# Patient Record
Sex: FEMALE | Race: WHITE | NOT HISPANIC OR LATINO | Employment: FULL TIME | URBAN - METROPOLITAN AREA
[De-identification: names, ages, dates, MRNs, and addresses within clinical notes are randomized per-mention and may not be internally consistent; named-entity substitution may affect disease eponyms.]

---

## 2018-02-05 ENCOUNTER — OFFICE VISIT (OUTPATIENT)
Dept: URGENT CARE | Facility: CLINIC | Age: 23
End: 2018-02-05
Payer: COMMERCIAL

## 2018-02-05 VITALS
WEIGHT: 102 LBS | DIASTOLIC BLOOD PRESSURE: 74 MMHG | HEIGHT: 62 IN | HEART RATE: 105 BPM | RESPIRATION RATE: 16 BRPM | BODY MASS INDEX: 18.77 KG/M2 | OXYGEN SATURATION: 100 % | TEMPERATURE: 98 F | SYSTOLIC BLOOD PRESSURE: 122 MMHG

## 2018-02-05 DIAGNOSIS — R52 BODY ACHES: ICD-10-CM

## 2018-02-05 DIAGNOSIS — R05.9 COUGH: ICD-10-CM

## 2018-02-05 DIAGNOSIS — B34.9 VIRAL SYNDROME: Primary | ICD-10-CM

## 2018-02-05 LAB
CTP QC/QA: YES
FLUAV AG NPH QL: NEGATIVE
FLUBV AG NPH QL: NEGATIVE

## 2018-02-05 PROCEDURE — 87804 INFLUENZA ASSAY W/OPTIC: CPT | Mod: 59,QW,S$GLB, | Performed by: FAMILY MEDICINE

## 2018-02-05 PROCEDURE — 3008F BODY MASS INDEX DOCD: CPT | Mod: S$GLB,,, | Performed by: FAMILY MEDICINE

## 2018-02-05 PROCEDURE — 99203 OFFICE O/P NEW LOW 30 MIN: CPT | Mod: S$GLB,,, | Performed by: FAMILY MEDICINE

## 2018-02-05 RX ORDER — OSELTAMIVIR PHOSPHATE 75 MG/1
75 CAPSULE ORAL 2 TIMES DAILY
Qty: 10 CAPSULE | Refills: 0 | Status: SHIPPED | OUTPATIENT
Start: 2018-02-05 | End: 2018-02-10

## 2018-02-05 RX ORDER — BENZONATATE 200 MG/1
200 CAPSULE ORAL 3 TIMES DAILY PRN
Qty: 30 CAPSULE | Refills: 0 | Status: SHIPPED | OUTPATIENT
Start: 2018-02-05 | End: 2018-02-15

## 2018-02-05 NOTE — PROGRESS NOTES
"Subjective:       Patient ID: Sharyn Hale is a 22 y.o. female.    Vitals:  height is 5' 2" (1.575 m) and weight is 46.3 kg (102 lb). Her oral temperature is 97.6 °F (36.4 °C). Her blood pressure is 122/74 and her pulse is 105. Her respiration is 16 and oxygen saturation is 100%.     Chief Complaint: Cough    Cough   This is a new problem. The current episode started yesterday. The problem has been unchanged. The problem occurs every few minutes. The cough is non-productive. Associated symptoms include headaches, myalgias, nasal congestion, postnasal drip and a sore throat. Pertinent negatives include no chest pain, chills, ear pain, eye redness, fever, shortness of breath or wheezing. Nothing aggravates the symptoms. She has tried nothing for the symptoms.     Review of Systems   Constitution: Negative for chills, fever and malaise/fatigue.   HENT: Positive for postnasal drip and sore throat. Negative for congestion, ear pain and hoarse voice.    Eyes: Negative for discharge and redness.   Cardiovascular: Negative for chest pain, dyspnea on exertion and leg swelling.   Respiratory: Positive for cough. Negative for shortness of breath, sputum production and wheezing.    Musculoskeletal: Positive for myalgias.   Gastrointestinal: Negative for abdominal pain and nausea.   Neurological: Positive for headaches.       Objective:      Physical Exam   Constitutional: She is oriented to person, place, and time. She appears well-developed and well-nourished.   HENT:   Head: Normocephalic and atraumatic.   Right Ear: External ear normal.   Left Ear: External ear normal.   Mouth/Throat: Oropharynx is clear and moist.   Eyes: EOM are normal. Pupils are equal, round, and reactive to light.   Neck: Normal range of motion. Neck supple. No JVD present. No tracheal deviation present. No thyromegaly present.   Cardiovascular: Normal rate, regular rhythm and normal heart sounds.  Exam reveals no gallop and no friction rub.    No " murmur heard.  Pulmonary/Chest: Breath sounds normal. No respiratory distress. She has no wheezes. She has no rales. She exhibits no tenderness.   Abdominal: Soft. Bowel sounds are normal. She exhibits no distension and no mass. There is no tenderness. There is no rebound and no guarding. No hernia.   Musculoskeletal: Normal range of motion. She exhibits no edema, tenderness or deformity.   Lymphadenopathy:     She has no cervical adenopathy.   Neurological: She is alert and oriented to person, place, and time. She displays normal reflexes. No cranial nerve deficit. She exhibits normal muscle tone. Coordination normal.   Skin: Skin is warm. Capillary refill takes less than 2 seconds. No rash noted. No erythema. No pallor.   Psychiatric: She has a normal mood and affect. Her behavior is normal. Judgment and thought content normal.   Vitals reviewed.      Assessment:       1. Viral syndrome    2. Body aches    3. Cough        Plan:         Viral syndrome  -     oseltamivir (TAMIFLU) 75 MG capsule; Take 1 capsule (75 mg total) by mouth 2 (two) times daily.  Dispense: 10 capsule; Refill: 0    Body aches  -     POCT Influenza A/B    Cough  -     benzonatate (TESSALON) 200 MG capsule; Take 1 capsule (200 mg total) by mouth 3 (three) times daily as needed for Cough.  Dispense: 30 capsule; Refill: 0      Follow up with your doctor in a few days as needed.  Return to the urgent care or go to the ER if symptoms get worse.    Jose Elias Castro MD

## 2018-02-05 NOTE — LETTER
February 5, 2018      [facility]  [place]  [address]  [place] [address]  Phone: [phone]  Fax: [phone]       Patient: Sharyn Hale   YOB: 1995  Date of Visit: 02/05/2018    To Whom It May Concern:    Angela Hale  was at [facility] on 02/05/2018. She may return to work/school on 2/8/18 with no restrictions. If you have any questions or concerns, or if I can be of further assistance, please do not hesitate to contact me.    Sincerely,    Jose Elias Castro MD

## 2018-02-06 NOTE — PATIENT INSTRUCTIONS
"  Viral Syndrome (Adult)  A viral illness may cause a number of symptoms. The symptoms depend on the part of the body that the virus affects. If it settles in your nose, throat, and lungs, it may cause cough, sore throat, congestion, and sometimes headache. If it settles in your stomach and intestinal tract, it may cause vomiting and diarrhea. Sometimes it causes vague symptoms like "aching all over," feeling tired, loss of appetite, or fever.  A viral illness usually lasts 1 to 2 weeks, but sometimes it lasts longer. In some cases, a more serious infection can look like a viral syndrome in the first few days of the illness. You may need another exam and additional tests to know the difference. Watch for the warning signs listed below.  Home care  Follow these guidelines for taking care of yourself at home:  · If symptoms are severe, rest at home for the first 2 to 3 days.  · Stay away from cigarette smoke - both your smoke and the smoke from others.  · You may use over-the-counter acetaminophen or ibuprofen for fever, muscle aching, and headache, unless another medicine was prescribed for this. If you have chronic liver or kidney disease or ever had a stomach ulcer or GI bleeding, talk with your doctor before using these medicines. No one who is younger than 18 and ill with a fever should take aspirin. It may cause severe disease or death.  · Your appetite may be poor, so a light diet is fine. Avoid dehydration by drinking 8 to 12 8-ounce glasses of fluids each day. This may include water; orange juice; lemonade; apple, grape, and cranberry juice; clear fruit drinks; electrolyte replacement and sports drinks; and decaffeinated teas and coffee. If you have been diagnosed with a kidney disease, ask your doctor how much and what types of fluids you should drink to prevent dehydration. If you have kidney disease, drinking too much fluid can cause it build up in the your body and be dangerous to your " health.  · Over-the-counter remedies won't shorten the length of the illness but may be helpful for cough, sore throat; and nasal and sinus congestion. Don't use decongestants if you have high blood pressure.  Follow-up care  Follow up with your healthcare provider if you do not improve over the next week.  Call 911  Get emergency medical care if any of the following occur:  · Convulsion  · Feeling weak, dizzy, or like you are going to faint  · Chest pain, shortness of breath, wheezing, or difficulty breathing  When to seek medical advice  Call your healthcare provider right away if any of these occur:  · Cough with lots of colored sputum (mucus) or blood in your sputum  · Chest pain, shortness of breath, wheezing, or difficulty breathing  · Severe headache; face, neck, or ear pain  · Severe, constant pain in the lower right side of your belly (abdominal)  · Continued vomiting (cant keep liquids down)  · Frequent diarrhea (more than 5 times a day); blood (red or black color) or mucus in diarrhea  · Feeling weak, dizzy, or like you are going to faint  · Extreme thirst  · Fever of 100.4°F (38°C) or higher, or as directed by your healthcare provider  Date Last Reviewed: 9/25/2015  © 9589-2749 [employer]. [address] [place] [address], [place] [address]. All rights reserved. This information is not intended as a substitute for professional medical care. Always follow your healthcare professional's instructions.      Follow up with your doctor in a few days as needed.  Return to the urgent care or go to the ER if symptoms get worse.    Jose Elias Castro MD

## 2019-02-22 ENCOUNTER — OFFICE VISIT (OUTPATIENT)
Dept: URGENT CARE | Facility: CLINIC | Age: 24
End: 2019-02-22
Payer: COMMERCIAL

## 2019-02-22 VITALS
DIASTOLIC BLOOD PRESSURE: 67 MMHG | SYSTOLIC BLOOD PRESSURE: 114 MMHG | TEMPERATURE: 98 F | BODY MASS INDEX: 18.66 KG/M2 | WEIGHT: 102 LBS | HEART RATE: 90 BPM | OXYGEN SATURATION: 100 %

## 2019-02-22 DIAGNOSIS — J11.1 INFLUENZA: Primary | ICD-10-CM

## 2019-02-22 PROCEDURE — 96372 THER/PROPH/DIAG INJ SC/IM: CPT | Mod: S$GLB,,, | Performed by: FAMILY MEDICINE

## 2019-02-22 PROCEDURE — 3008F BODY MASS INDEX DOCD: CPT | Mod: CPTII,S$GLB,, | Performed by: FAMILY MEDICINE

## 2019-02-22 PROCEDURE — 96372 PR INJECTION,THERAP/PROPH/DIAG2ST, IM OR SUBCUT: ICD-10-PCS | Mod: S$GLB,,, | Performed by: FAMILY MEDICINE

## 2019-02-22 PROCEDURE — 99214 PR OFFICE/OUTPT VISIT, EST, LEVL IV, 30-39 MIN: ICD-10-PCS | Mod: 25,S$GLB,, | Performed by: FAMILY MEDICINE

## 2019-02-22 PROCEDURE — 3008F PR BODY MASS INDEX (BMI) DOCUMENTED: ICD-10-PCS | Mod: CPTII,S$GLB,, | Performed by: FAMILY MEDICINE

## 2019-02-22 PROCEDURE — 99214 OFFICE O/P EST MOD 30 MIN: CPT | Mod: 25,S$GLB,, | Performed by: FAMILY MEDICINE

## 2019-02-22 RX ORDER — OSELTAMIVIR PHOSPHATE 75 MG/1
75 CAPSULE ORAL 2 TIMES DAILY
Qty: 10 CAPSULE | Refills: 0 | Status: SHIPPED | OUTPATIENT
Start: 2019-02-22 | End: 2019-02-27

## 2019-02-22 RX ORDER — DEXAMETHASONE SODIUM PHOSPHATE 100 MG/10ML
5 INJECTION INTRAMUSCULAR; INTRAVENOUS
Status: COMPLETED | OUTPATIENT
Start: 2019-02-22 | End: 2019-02-22

## 2019-02-22 RX ORDER — NAPROXEN 375 MG/1
375 TABLET ORAL 2 TIMES DAILY
Qty: 20 TABLET | Refills: 0 | Status: SHIPPED | OUTPATIENT
Start: 2019-02-22 | End: 2019-04-05

## 2019-02-22 RX ORDER — ONDANSETRON 4 MG/1
4 TABLET, FILM COATED ORAL EVERY 6 HOURS PRN
Qty: 20 TABLET | Refills: 0 | Status: SHIPPED | OUTPATIENT
Start: 2019-02-22 | End: 2019-04-05

## 2019-02-22 RX ADMIN — DEXAMETHASONE SODIUM PHOSPHATE 5 MG: 100 INJECTION INTRAMUSCULAR; INTRAVENOUS at 02:02

## 2019-02-22 NOTE — LETTER
February 22, 2019  Sharyn Hale  [address]  [place] [address]                [facility] [place]  [address], [address]  [place] [address]  Phone: [phone]  Fax: [phone] Sharyn Hale was seen and treated in our Urgent Care department   on 2/22/2019. She may return to work in 3 - 5 days.      If you have any questions or concerns, please don't hesitate to call.    Sincerely,        Barber Carter MD

## 2019-02-22 NOTE — PROGRESS NOTES
Subjective:       Patient ID: Sharyn Hale is a 23 y.o. female.    Vitals:  weight is 46.3 kg (102 lb). Her oral temperature is 97.9 °F (36.6 °C). Her blood pressure is 114/67 and her pulse is 90. Her oxygen saturation is 100%.     Chief Complaint: Sore Throat    Sore Throat    This is a new problem. Episode onset: 2 days ago. The problem has been waxing and waning. There has been no fever. The pain is at a severity of 8/10. The pain is moderate. Associated symptoms include coughing, diarrhea, headaches and neck pain. Pertinent negatives include no congestion, ear pain, shortness of breath, stridor, swollen glands, trouble swallowing or vomiting. She has tried nothing for the symptoms.       Constitution: Positive for chills. Negative for sweating, fatigue and fever.   HENT: Positive for sinus pressure and sore throat. Negative for ear pain, congestion, sinus pain, trouble swallowing and voice change.    Neck: Positive for neck pain. Negative for painful lymph nodes.   Eyes: Negative for eye redness.   Respiratory: Positive for cough. Negative for chest tightness, sputum production, bloody sputum, COPD, shortness of breath, stridor, wheezing and asthma.    Gastrointestinal: Positive for nausea and diarrhea. Negative for vomiting.   Musculoskeletal: Negative for muscle ache.   Skin: Negative for rash.   Allergic/Immunologic: Negative for seasonal allergies and asthma.   Neurological: Positive for headaches.   Hematologic/Lymphatic: Negative for swollen lymph nodes.       Objective:      Physical Exam   Constitutional: She is oriented to person, place, and time. She appears well-developed and well-nourished. She is cooperative.  Non-toxic appearance. She does not appear ill. No distress.   HENT:   Head: Normocephalic and atraumatic.   Right Ear: Hearing, tympanic membrane, external ear and ear canal normal.   Left Ear: Hearing, tympanic membrane, external ear and ear canal normal.   Nose: Nose normal. No mucosal  edema, rhinorrhea or nasal deformity. No epistaxis. Right sinus exhibits no maxillary sinus tenderness and no frontal sinus tenderness. Left sinus exhibits no maxillary sinus tenderness and no frontal sinus tenderness.   Mouth/Throat: Uvula is midline and mucous membranes are normal. No trismus in the jaw. Normal dentition. No uvula swelling. Posterior oropharyngeal edema and posterior oropharyngeal erythema present.   Eyes: Conjunctivae and lids are normal. No scleral icterus.   Neck: Trachea normal, full passive range of motion without pain and phonation normal. Neck supple.   Cardiovascular: Normal rate, regular rhythm, normal heart sounds, intact distal pulses and normal pulses.   Pulmonary/Chest: Effort normal and breath sounds normal. No respiratory distress.   Abdominal: Soft. Normal appearance and bowel sounds are normal. She exhibits no distension. There is no tenderness.   Musculoskeletal: Normal range of motion. She exhibits no edema or deformity.   Neurological: She is alert and oriented to person, place, and time. She exhibits normal muscle tone. Coordination normal.   Skin: Skin is warm, dry and intact. She is not diaphoretic. No pallor.   Psychiatric: She has a normal mood and affect. Her speech is normal and behavior is normal. Judgment and thought content normal. Cognition and memory are normal.   Nursing note and vitals reviewed.      Assessment:       1. Influenza        Plan:         Influenza  -     oseltamivir (TAMIFLU) 75 MG capsule; Take 1 capsule (75 mg total) by mouth 2 (two) times daily. for 5 days  Dispense: 10 capsule; Refill: 0  -     dexchlorpheniramin-pseudoephed (RESCON) 2-60 mg Tab; Take 1 tablet by mouth 2 (two) times daily as needed.  Dispense: 20 tablet; Refill: 0  -     naproxen (NAPROSYN) 375 MG tablet; Take 1 tablet (375 mg total) by mouth 2 (two) times daily.  Dispense: 20 tablet; Refill: 0  -     ondansetron (ZOFRAN) 4 MG tablet; Take 1 tablet (4 mg total) by mouth every 6  (six) hours as needed for Nausea.  Dispense: 20 tablet; Refill: 0  -     dexamethasone injection 5 mg    Please drink plenty of fluids.  Please get plenty of rest.  Please return here or go to the Emergency Department for any concerns or worsening of condition.  If you were given wait & see antibiotics, please wait 3-5 days before taking them, and only take them if your symptoms have worsened or not improved.  If you do begin taking the antibiotics, please take them to completion.  If you were prescribed antibiotics, please take them to completion.    If you were prescribed a narcotic medication, do not drive or operate heavy equipment or machinery while taking these medications.    You were given a decongestant (RESCON or POLY VENT Dm).  If your insurance does not cover it or you cannot afford it, it is ok to use the over the counter products listed below.  If you do not have Hypertension or any history of palpitations, it is ok to take over the counter Sudafed or Mucinex D or Allegra-D or Claritin-D or Zyrtec-D.  If you do take one of the above, it is ok to combine that with plain over the counter Mucinex or Allegra or Claritin or Zyrtec.  If for example you are taking Zyrtec -D, you can combine that with Mucinex, but not Mucinex-D.  If you are taking Mucinex-D, you can combine that with plain Allegra or Claritin or Zyrtec.   If you do have Hypertension or palpitations, it is safe to take Coricidin HBP for relief of sinus symptoms.    We recommend you take over the counter Flonase (Fluticasone) or another nasally inhaled steroid unless you are already taking one.      Nasal irrigation with a saline spray or Netti Pot like device per their directions is also recommended.  If not allergic, please take over the counter Tylenol (Acetaminophen)  as directed for control of pain and/or fever.  You may take Sudafed every 6 hours as directed for congestion.    You were given an injection of steroid.  This will relieve  swelling and inflammation and improve respiratory symptoms.  It can raise your blood sugar if you are diabetic.    Please follow up with your primary care doctor or specialist as needed.    Tory Gallagher MD  [phone]    If you  smoke, please stop smoking.

## 2019-02-22 NOTE — PATIENT INSTRUCTIONS
Please drink plenty of fluids.  Please get plenty of rest.  Please return here or go to the Emergency Department for any concerns or worsening of condition.  If you were given wait & see antibiotics, please wait 3-5 days before taking them, and only take them if your symptoms have worsened or not improved.  If you do begin taking the antibiotics, please take them to completion.  If you were prescribed antibiotics, please take them to completion.    If you were prescribed a narcotic medication, do not drive or operate heavy equipment or machinery while taking these medications.    You were given a decongestant (RESCON or POLY VENT Dm).  If your insurance does not cover it or you cannot afford it, it is ok to use the over the counter products listed below.  If you do not have Hypertension or any history of palpitations, it is ok to take over the counter Sudafed or Mucinex D or Allegra-D or Claritin-D or Zyrtec-D.  If you do take one of the above, it is ok to combine that with plain over the counter Mucinex or Allegra or Claritin or Zyrtec.  If for example you are taking Zyrtec -D, you can combine that with Mucinex, but not Mucinex-D.  If you are taking Mucinex-D, you can combine that with plain Allegra or Claritin or Zyrtec.   If you do have Hypertension or palpitations, it is safe to take Coricidin HBP for relief of sinus symptoms.    We recommend you take over the counter Flonase (Fluticasone) or another nasally inhaled steroid unless you are already taking one.      Nasal irrigation with a saline spray or Netti Pot like device per their directions is also recommended.  If not allergic, please take over the counter Tylenol (Acetaminophen)  as directed for control of pain and/or fever.  You may take Sudafed every 6 hours as directed for congestion.    You were given an injection of steroid.  This will relieve swelling and inflammation and improve respiratory symptoms.  It can raise your blood sugar if you are  diabetic.    Please follow up with your primary care doctor or specialist as needed.    Tory Gallaghre MD  [phone]    If you  smoke, please stop smoking.         Influenza (Adult)    Influenza is also called the flu. It is a viral illness that affects the air passages of your lungs. It is different from the common cold. The flu can easily be passed from one to person to another. It may be spread through the air by coughing and sneezing. Or it can be spread by touching the sick person and then touching your own eyes, nose, or mouth.  The flu starts 1 to 3 days after you are exposed to the flu virus. It may last for 1 to 2 weeks. You usually dont need to take antibiotics unless you have a complication. This might be an ear or sinus infection or pneumonia.  Symptoms of the flu may be mild or severe. They can include extreme tiredness (wanting to stay in bed all day), chills, fevers, muscle aches, soreness with eye movement, headache, and a dry, hacking cough.  Home care  Follow these guidelines when caring for yourself at home:  · Avoid being around cigarette smoke, whether yours or other peoples.  · Acetaminophen or ibuprofen will help ease your fever, muscle aches, and headache. Dont give aspirin to anyone younger than 18 who has the flu. Aspirin can harm the liver.  · Nausea and loss of appetite are common with the flu. Eat light meals. Drink 6 to 8 glasses of liquids every day. Good choices are water, sport drinks, soft drinks without caffeine, juices, tea, and soup. Extra fluids will also help loosen secretions in your nose and lungs.  · Over-the-counter cold medicines will not make the flu go away faster. But the medicines may help with coughing, sore throat, and congestion in your nose and sinuses. Dont use a decongestant if you have high blood pressure.  · Stay home until your fever has been gone for at least 24 hours without using medicine to reduce fever.  Follow-up care  Follow up with your  healthcare provider, or as advised, if you are not getting better over the next week.  If you are 65 or older, talk with your provider about getting a pneumococcal vaccine every 5 years. You should also get this vaccine if you have chronic asthma or COPD. All adults should get a flu vaccine every fall. Ask your provider about this.  When to seek medical advice  Call your healthcare provider right away if any of these occur:  · Cough with lots of colored sputum (mucus) or blood in your sputum  · Chest pain, shortness of breath, wheezing, or difficulty breathing  · Severe headache, or face, neck, or ear pain  · New rash with fever  · Fever of 100.4°F (38°C) or higher, or as directed by your healthcare provider  · Confusion, behavior change, or seizure  · Severe weakness or dizziness  · You get a fever or cough after getting better for a few days  Date Last Reviewed: 12/23/2014  © 9040-9405 [employer]. [address] [place] [address], [place] [address]. All rights reserved. This information is not intended as a substitute for professional medical care. Always follow your healthcare professional's instructions.

## 2019-04-05 ENCOUNTER — OFFICE VISIT (OUTPATIENT)
Dept: URGENT CARE | Facility: CLINIC | Age: 24
End: 2019-04-05
Payer: COMMERCIAL

## 2019-04-05 VITALS
HEIGHT: 62 IN | SYSTOLIC BLOOD PRESSURE: 102 MMHG | WEIGHT: 102 LBS | DIASTOLIC BLOOD PRESSURE: 62 MMHG | TEMPERATURE: 101 F | RESPIRATION RATE: 18 BRPM | BODY MASS INDEX: 18.77 KG/M2 | HEART RATE: 122 BPM | OXYGEN SATURATION: 100 %

## 2019-04-05 DIAGNOSIS — R50.9 FEVER, UNSPECIFIED FEVER CAUSE: ICD-10-CM

## 2019-04-05 DIAGNOSIS — J02.0 ACUTE STREPTOCOCCAL PHARYNGITIS: Primary | ICD-10-CM

## 2019-04-05 LAB
CTP QC/QA: YES
S PYO RRNA THROAT QL PROBE: POSITIVE

## 2019-04-05 PROCEDURE — 96372 THER/PROPH/DIAG INJ SC/IM: CPT | Mod: S$GLB,,, | Performed by: PHYSICIAN ASSISTANT

## 2019-04-05 PROCEDURE — 87880 STREP A ASSAY W/OPTIC: CPT | Mod: QW,S$GLB,, | Performed by: PHYSICIAN ASSISTANT

## 2019-04-05 PROCEDURE — 96372 PR INJECTION,THERAP/PROPH/DIAG2ST, IM OR SUBCUT: ICD-10-PCS | Mod: S$GLB,,, | Performed by: PHYSICIAN ASSISTANT

## 2019-04-05 PROCEDURE — 87880 POCT RAPID STREP A: ICD-10-PCS | Mod: QW,S$GLB,, | Performed by: PHYSICIAN ASSISTANT

## 2019-04-05 PROCEDURE — 99214 PR OFFICE/OUTPT VISIT, EST, LEVL IV, 30-39 MIN: ICD-10-PCS | Mod: 25,S$GLB,, | Performed by: PHYSICIAN ASSISTANT

## 2019-04-05 PROCEDURE — 3008F BODY MASS INDEX DOCD: CPT | Mod: CPTII,S$GLB,, | Performed by: PHYSICIAN ASSISTANT

## 2019-04-05 PROCEDURE — 99214 OFFICE O/P EST MOD 30 MIN: CPT | Mod: 25,S$GLB,, | Performed by: PHYSICIAN ASSISTANT

## 2019-04-05 PROCEDURE — 3008F PR BODY MASS INDEX (BMI) DOCUMENTED: ICD-10-PCS | Mod: CPTII,S$GLB,, | Performed by: PHYSICIAN ASSISTANT

## 2019-04-05 RX ORDER — CEFDINIR 300 MG/1
300 CAPSULE ORAL 2 TIMES DAILY
Qty: 14 CAPSULE | Refills: 0 | Status: SHIPPED | OUTPATIENT
Start: 2019-04-05 | End: 2019-04-12

## 2019-04-05 RX ORDER — BETAMETHASONE SODIUM PHOSPHATE AND BETAMETHASONE ACETATE 3; 3 MG/ML; MG/ML
6 INJECTION, SUSPENSION INTRA-ARTICULAR; INTRALESIONAL; INTRAMUSCULAR; SOFT TISSUE
Status: COMPLETED | OUTPATIENT
Start: 2019-04-05 | End: 2019-04-05

## 2019-04-05 RX ORDER — IBUPROFEN 200 MG
600 TABLET ORAL
Status: COMPLETED | OUTPATIENT
Start: 2019-04-05 | End: 2019-04-05

## 2019-04-05 RX ADMIN — BETAMETHASONE SODIUM PHOSPHATE AND BETAMETHASONE ACETATE 6 MG: 3; 3 INJECTION, SUSPENSION INTRA-ARTICULAR; INTRALESIONAL; INTRAMUSCULAR; SOFT TISSUE at 01:04

## 2019-04-05 RX ADMIN — Medication 600 MG: at 01:04

## 2019-04-05 NOTE — PROGRESS NOTES
"Subjective:       Patient ID: Sharyn Hale is a 23 y.o. female.    Vitals:  height is 5' 2" (1.575 m) and weight is 46.3 kg (102 lb). Her temperature is 101.3 °F (38.5 °C) (abnormal). Her blood pressure is 102/62 and her pulse is 122 (abnormal). Her respiration is 18 and oxygen saturation is 100%.     Chief Complaint: Sinus Problem    Sinus Problem   This is a new problem. The current episode started yesterday. The problem is unchanged. The maximum temperature recorded prior to her arrival was 100.4 - 100.9 F. The pain is mild. Associated symptoms include headaches and a sore throat. Pertinent negatives include no chills, congestion, coughing, diaphoresis, ear pain, shortness of breath or sinus pressure. Past treatments include nothing.       Constitution: Positive for fever (at home temp 100/ didn't take anything for it ). Negative for chills, sweating and fatigue.   HENT: Positive for postnasal drip and sore throat. Negative for ear pain, congestion, sinus pain, sinus pressure and voice change.    Neck: Positive for painful lymph nodes.   Eyes: Negative for eye redness.   Respiratory: Negative for chest tightness, cough, sputum production, bloody sputum, COPD, shortness of breath, stridor, wheezing and asthma.    Gastrointestinal: Negative for nausea and vomiting.   Musculoskeletal: Positive for muscle ache.   Skin: Negative for rash.   Allergic/Immunologic: Negative for seasonal allergies and asthma.   Neurological: Positive for headaches.   Hematologic/Lymphatic: Positive for swollen lymph nodes.       Objective:      Physical Exam   Constitutional: She is oriented to person, place, and time. She appears well-developed and well-nourished. She is cooperative.  Non-toxic appearance. She does not have a sickly appearance. She does not appear ill. No distress.   HENT:   Head: Normocephalic and atraumatic.   Right Ear: Hearing, tympanic membrane, external ear and ear canal normal.   Left Ear: Hearing, tympanic " membrane, external ear and ear canal normal.   Nose: Nose normal. No mucosal edema, rhinorrhea or nasal deformity. No epistaxis. Right sinus exhibits no maxillary sinus tenderness and no frontal sinus tenderness. Left sinus exhibits no maxillary sinus tenderness and no frontal sinus tenderness.   Mouth/Throat: Uvula is midline and mucous membranes are normal. No trismus in the jaw. Normal dentition. No uvula swelling. Posterior oropharyngeal edema (mild) and posterior oropharyngeal erythema present. No oropharyngeal exudate. Tonsils are 1+ on the right. Tonsils are 1+ on the left. No tonsillar exudate.   Eyes: Pupils are equal, round, and reactive to light. Conjunctivae, EOM and lids are normal. No scleral icterus.   Sclera clear bilat   Neck: Trachea normal, full passive range of motion without pain and phonation normal. Neck supple. No neck rigidity. No edema and no erythema present.   Cardiovascular: Regular rhythm, S1 normal, S2 normal, normal heart sounds, intact distal pulses and normal pulses. Tachycardia present.   Pulmonary/Chest: Effort normal and breath sounds normal. No respiratory distress. She has no decreased breath sounds. She has no wheezes. She has no rhonchi. She has no rales.   Abdominal: Soft. Normal appearance and bowel sounds are normal. She exhibits no distension. There is no tenderness.   Musculoskeletal: Normal range of motion. She exhibits no edema or deformity.   Lymphadenopathy:        Head (right side): Tonsillar adenopathy present.        Head (left side): Tonsillar adenopathy present.   Neurological: She is alert and oriented to person, place, and time. She exhibits normal muscle tone. Coordination normal.   Skin: Skin is warm, dry and intact. She is not diaphoretic. No pallor.   Psychiatric: She has a normal mood and affect. Her speech is normal and behavior is normal. Judgment and thought content normal. Cognition and memory are normal.   Nursing note and vitals reviewed.         Office Visit on 04/05/2019   Component Date Value Ref Range Status    Rapid Strep A Screen 04/05/2019 Positive* Negative Final     Acceptable 04/05/2019 Yes   Final       Assessment:       1. Acute streptococcal pharyngitis    2. Fever, unspecified fever cause        Plan:         Acute streptococcal pharyngitis  -     cefdinir (OMNICEF) 300 MG capsule; Take 1 capsule (300 mg total) by mouth 2 (two) times daily. for 7 days  Dispense: 14 capsule; Refill: 0  -     betamethasone acetate-betamethasone sodium phosphate injection 6 mg    Fever, unspecified fever cause  -     POCT rapid strep A  -     ibuprofen tablet 600 mg      Patient Instructions   · Follow up with your primary care in 2-5 days if symptoms have not improved, or you may return here.  · If you were referred to a specialist, please follow up with that specialty.  · If you were prescribed antibiotics, please take them to completion.  · If you were prescribed a narcotic or any medication with sedative effects, do not drive or operate heavy equipment or machinery while taking these medications.  · You must understand that you have received treatment at an Urgent Care facility only, and that you may be released before all of your medical problems are known or treated. Urgent Care facilities are not equipped to handle life threatening emergencies. It is recommended that you go to an Emergency Department for further evaluation of worsening or concerning symptoms, or possibly life threatening conditions as discussed.                                        If you  smoke, please stop smoking        Symptomatic treatment:    Alternate tylenol and motrin every 4-6 hours  salt water gargles  Cold-eeze helps to reduce the duration of sore throat symptoms  Cepachol helps to numb the discomfort  Chloroseptic spray  Nasal saline spray reduces inflammation and dryness  Warm face compresses as often as you can  Vicks vapor rub at night  Flonase OTC or  Nasacort OTC  Simple foods like chicken noodle soup help  Pedialyte helps with dehydration if lacking appetite  Rest as much as you can        Pharyngitis: Strep (Confirmed)    You have had a positive test for strep throat. Strep throat is a contagious illness. It is spread by coughing, kissing or by touching others after touching your mouth or nose. Symptoms include throat pain that is worse with swallowing, aching all over, headache, and fever. It is treated with antibiotic medicine. This should help you start to feel better in 1 to 2 days.  Home care  · Rest at home. Drink plenty of fluids to you won't get dehydrated.  · No work or school for the first 2 days of taking the antibiotics. After this time, you will not be contagious. You can then return to school or work if you are feeling better.   · Take antibiotic medicine for the full 10 days, even if you feel better. This is very important to ensure the infection is treated. It is also important to prevent medicine-resistant germs from developing. If you were given an antibiotic shot, you don't need any more antibiotics.  · You may use acetaminophen or ibuprofen to control pain or fever, unless another medicine was prescribed for this. Talk with your doctor before taking these medicines if you have chronic liver or kidney disease. Also talk with your doctor if you have had a stomach ulcer or GI bleeding.  · Throat lozenges or sprays help reduce pain. Gargling with warm saltwater will also reduce throat pain. Dissolve 1/2 teaspoon of salt in 1 glass of warm water. This may be useful just before meals.   · Soft foods are OK. Avoid salty or spicy foods.  Follow-up care  Follow up with your healthcare provider or our staff if you don't get better over the next week.  When to seek medical advice  Call your healthcare provider right away if any of these occur:  · Fever of 100.4ºF (38ºC) or higher, or as directed by your healthcare provider  · New or worsening ear  pain, sinus pain, or headache  · Painful lumps in the back of neck  · Stiff neck  · Lymph nodes getting larger or becoming soft in the middle  · You can't swallow liquids or you can't open your mouth wide because of throat pain  · Signs of dehydration. These include very dark urine or no urine, sunken eyes, and dizziness.  · Trouble breathing or noisy breathing  · Muffled voice  · Rash  Date Last Reviewed: 4/13/2015  © 7158-3425 [employer]. [address] [place] [address], [place] [address]. All rights reserved. This information is not intended as a substitute for professional medical care. Always follow your healthcare professional's instructions.

## 2019-04-05 NOTE — PATIENT INSTRUCTIONS
· Follow up with your primary care in 2-5 days if symptoms have not improved, or you may return here.  · If you were referred to a specialist, please follow up with that specialty.  · If you were prescribed antibiotics, please take them to completion.  · If you were prescribed a narcotic or any medication with sedative effects, do not drive or operate heavy equipment or machinery while taking these medications.  · You must understand that you have received treatment at an Urgent Care facility only, and that you may be released before all of your medical problems are known or treated. Urgent Care facilities are not equipped to handle life threatening emergencies. It is recommended that you go to an Emergency Department for further evaluation of worsening or concerning symptoms, or possibly life threatening conditions as discussed.                                        If you  smoke, please stop smoking        Symptomatic treatment:    Alternate tylenol and motrin every 4-6 hours  salt water gargles  Cold-eeze helps to reduce the duration of sore throat symptoms  Cepachol helps to numb the discomfort  Chloroseptic spray  Nasal saline spray reduces inflammation and dryness  Warm face compresses as often as you can  Vicks vapor rub at night  Flonase OTC or Nasacort OTC  Simple foods like chicken noodle soup help  Pedialyte helps with dehydration if lacking appetite  Rest as much as you can        Pharyngitis: Strep (Confirmed)    You have had a positive test for strep throat. Strep throat is a contagious illness. It is spread by coughing, kissing or by touching others after touching your mouth or nose. Symptoms include throat pain that is worse with swallowing, aching all over, headache, and fever. It is treated with antibiotic medicine. This should help you start to feel better in 1 to 2 days.  Home care  · Rest at home. Drink plenty of fluids to you won't get dehydrated.  · No work or school for the first 2 days of  taking the antibiotics. After this time, you will not be contagious. You can then return to school or work if you are feeling better.   · Take antibiotic medicine for the full 10 days, even if you feel better. This is very important to ensure the infection is treated. It is also important to prevent medicine-resistant germs from developing. If you were given an antibiotic shot, you don't need any more antibiotics.  · You may use acetaminophen or ibuprofen to control pain or fever, unless another medicine was prescribed for this. Talk with your doctor before taking these medicines if you have chronic liver or kidney disease. Also talk with your doctor if you have had a stomach ulcer or GI bleeding.  · Throat lozenges or sprays help reduce pain. Gargling with warm saltwater will also reduce throat pain. Dissolve 1/2 teaspoon of salt in 1 glass of warm water. This may be useful just before meals.   · Soft foods are OK. Avoid salty or spicy foods.  Follow-up care  Follow up with your healthcare provider or our staff if you don't get better over the next week.  When to seek medical advice  Call your healthcare provider right away if any of these occur:  · Fever of 100.4ºF (38ºC) or higher, or as directed by your healthcare provider  · New or worsening ear pain, sinus pain, or headache  · Painful lumps in the back of neck  · Stiff neck  · Lymph nodes getting larger or becoming soft in the middle  · You can't swallow liquids or you can't open your mouth wide because of throat pain  · Signs of dehydration. These include very dark urine or no urine, sunken eyes, and dizziness.  · Trouble breathing or noisy breathing  · Muffled voice  · Rash  Date Last Reviewed: 4/13/2015  © 8015-7358 [employer]. [address] [place] [address], [place] [address]. All rights reserved. This information is not intended as a substitute for professional medical care. Always follow your healthcare professional's instructions.

## 2019-04-05 NOTE — LETTER
April 5, 2019      [facility]  [address], [address]  [facility] [address]  Phone: [phone]  Fax: [phone]       Patient: Sharyn Hale   YOB: 1995  Date of Visit: 04/05/2019    To Whom It May Concern:    Angela Hale  was at [facility] on 04/05/2019. She may return to work/school on 4/7/2019 with no restrictions. If you have any questions or concerns, or if I can be of further assistance, please do not hesitate to contact me.    Sincerely,    Marquez Galeano PA-C

## 2019-05-01 ENCOUNTER — CLINICAL SUPPORT (OUTPATIENT)
Dept: URGENT CARE | Facility: CLINIC | Age: 24
End: 2019-05-01
Payer: COMMERCIAL

## 2019-05-01 VITALS — HEIGHT: 62 IN | WEIGHT: 105 LBS | BODY MASS INDEX: 19.32 KG/M2

## 2019-05-01 DIAGNOSIS — Z11.1 PPD SCREENING TEST: Primary | ICD-10-CM

## 2019-05-01 PROCEDURE — 86580 POCT TB SKIN TEST: ICD-10-PCS | Mod: S$GLB,,, | Performed by: FAMILY MEDICINE

## 2019-05-01 PROCEDURE — 86580 TB INTRADERMAL TEST: CPT | Mod: S$GLB,,, | Performed by: FAMILY MEDICINE

## 2019-09-18 ENCOUNTER — OFFICE VISIT (OUTPATIENT)
Dept: URGENT CARE | Facility: CLINIC | Age: 24
End: 2019-09-18
Payer: COMMERCIAL

## 2019-09-18 VITALS
SYSTOLIC BLOOD PRESSURE: 129 MMHG | DIASTOLIC BLOOD PRESSURE: 76 MMHG | TEMPERATURE: 99 F | WEIGHT: 105 LBS | HEART RATE: 112 BPM | OXYGEN SATURATION: 100 % | HEIGHT: 62 IN | BODY MASS INDEX: 19.32 KG/M2

## 2019-09-18 DIAGNOSIS — J02.9 ACUTE PHARYNGITIS, UNSPECIFIED ETIOLOGY: Primary | ICD-10-CM

## 2019-09-18 PROCEDURE — 3008F BODY MASS INDEX DOCD: CPT | Mod: CPTII,S$GLB,, | Performed by: PHYSICIAN ASSISTANT

## 2019-09-18 PROCEDURE — 3008F PR BODY MASS INDEX (BMI) DOCUMENTED: ICD-10-PCS | Mod: CPTII,S$GLB,, | Performed by: PHYSICIAN ASSISTANT

## 2019-09-18 PROCEDURE — 99214 OFFICE O/P EST MOD 30 MIN: CPT | Mod: S$GLB,,, | Performed by: PHYSICIAN ASSISTANT

## 2019-09-18 PROCEDURE — 99214 PR OFFICE/OUTPT VISIT, EST, LEVL IV, 30-39 MIN: ICD-10-PCS | Mod: S$GLB,,, | Performed by: PHYSICIAN ASSISTANT

## 2019-09-18 RX ORDER — PREDNISONE 10 MG/1
10 TABLET ORAL DAILY
Qty: 4 TABLET | Refills: 0 | Status: SHIPPED | OUTPATIENT
Start: 2019-09-18 | End: 2019-09-22

## 2019-09-18 RX ORDER — AMOXICILLIN AND CLAVULANATE POTASSIUM 875; 125 MG/1; MG/1
1 TABLET, FILM COATED ORAL EVERY 12 HOURS
Qty: 20 TABLET | Refills: 0 | Status: SHIPPED | OUTPATIENT
Start: 2019-09-18 | End: 2019-09-28

## 2019-09-18 RX ORDER — FLUCONAZOLE 150 MG/1
150 TABLET ORAL DAILY
Qty: 1 TABLET | Refills: 0 | Status: SHIPPED | OUTPATIENT
Start: 2019-09-18 | End: 2019-09-19

## 2019-09-18 NOTE — PATIENT INSTRUCTIONS
1.  Take all medications as directed. If you have been prescribed antibiotics, make sure to complete them.   2.  Make sure to get plenty of rest. (This is the best way to help your immune system fight illness.)   3.  Keep yourself/patient well hydrated. For adults, it is recommended to drink at least 8-10 glasses of water daily.   4.  Perform warm, salt water gargles to help reduce inflammation and throat discomfort. Chloraseptic sprays and throat lozenges will also help with your throat pain.  5. Change your toothbrush 24 hours after starting antibiotics to prevent reinfection.  6.  For patients above 6 months of age who are not allergic to and are not on anticoagulants, you can alternate Tylenol and Motrin every 4-6 hours for fever above 100.4F and/or pain.  For patients less than 6 months of age, allergic to or intolerant to NSAIDS, have gastritis, gastric ulcers, or history of GI bleeds, are pregnant, or are on anticoagulant therapy, you can take Tylenol every 4 hours as needed for fever above 100.4F and/or pain.   7. You should schedule a follow-up appointment with your Primary Care Provider/Pediatrician for recheck in 2-3 days or as directed at this visit.   8.  If your condition fails to improve in a timely manner, you should receive another evaluation by your Primary Care Provider/Pediatrician to discuss your concerns or return to urgent care for a recheck.  If your condition worsens at any time, you should report immediately to your nearest Emergency Department for further evaluation. **You must understand that you have received Urgent Care treatment only and that you may be released before all of your medical problems are known or treated. You, the patient, are responsible to arrange for follow-up care as instructed.           Pharyngitis: Strep (Presumed)    You have pharyngitis (sore throat). The cause is thought to be the streptococcus, or strep, bacterium. Strep throat infection can cause throat pain  that is worse when swallowing, aching all over, headache, and fever. The infection may be spread by coughing, kissing, or touching others after touching your mouth or nose. Antibiotic medications are given to treat the infection.  Home care  · Rest at home. Drink plenty of fluids to avoid dehydration.  · No work or school for the first 2 days of taking the antibiotics. After this time, you will not be contagious. You can then return to work or school if you are feeling better.   · The antibiotic medication must be taken for the full 10 days, even if you feel better. This is very important to ensure the infection is treated. It is also important to prevent drug-resistant organisms from developing. If you were given an antibiotic shot, no more antibiotics are needed.  · You may use acetaminophen or ibuprofen to control pain or fever, unless another medicine was prescribed for this. If you have chronic liver or kidney disease or ever had a stomach ulcer or GI bleeding, talk with your doctor before using these medicines.  · Throat lozenges or a throat-numbing sprays can help reduce throat pain. Gargling with warm salt water can also help. Dissolve 1/2 teaspoon of salt in 1 8 ounce glass of warm water.   · Avoid salty or spicy foods, which can irritate the throat.  Follow-up care  Follow up with your healthcare provider or our staff if you are not improving over the next week.  When to seek medical advice  Call your healthcare provider right away if any of these occur:  · Fever as directed by your doctor.   · New or worsening ear pain, sinus pain, or headache  · Painful lumps in the back of neck  · Stiff neck  · Lymph nodes are getting larger  · Inability to swallow liquids, excessive drooling, or inability to open mouth wide due to throat pain  · Signs of dehydration (very dark urine or no urine, sunken eyes, dizziness)  · Trouble breathing or noisy breathing  · Muffled voice  · New rash  Date Last Reviewed:  4/13/2015  © 1782-9207 [employer], "[employer] [address] [place] [address], [place] [address]. All rights reserved. This information is not intended as a substitute for professional medical care. Always follow your healthcare professional's instructions.

## 2019-09-18 NOTE — PROGRESS NOTES
"Subjective:       Patient ID: Sharyn Hale is a 23 y.o. female.    Vitals:  height is 5' 2" (1.575 m) and weight is 47.6 kg (105 lb). Her oral temperature is 99 °F (37.2 °C). Her blood pressure is 129/76 and her pulse is 112 (abnormal). Her oxygen saturation is 100%.     Chief Complaint: Sore Throat    23-year-old female presents to clinic today with complaints of a sore throat that has been present for the past 2 days.  Patient also complains of a pressure in her ears, a burning in her nose, and a burning in her eyes.  Patient states that she has not run fever that she is aware of.  She states that her boyfriend was diagnosed with strep 1 week ago.  Patient denies any other complaints at this time.    Sore Throat    This is a new problem. The current episode started yesterday. The problem has been gradually worsening. There has been no fever. Associated symptoms include ear pain (both ears) and headaches. Pertinent negatives include no abdominal pain, congestion, coughing, diarrhea, ear discharge, neck pain, trouble swallowing or vomiting. She has had exposure to strep. Treatments tried: clovis ramos. The treatment provided no relief.       Constitution: Positive for sweating. Negative for chills, fatigue and fever.   HENT: Positive for ear pain (both ears) and sore throat. Negative for ear discharge, congestion, sinus pain, sinus pressure, trouble swallowing and voice change.         Burning in nose   Neck: Positive for painful lymph nodes. Negative for neck pain.   Cardiovascular: Negative for chest pain.   Eyes: Positive for eye pain (burning). Negative for eye redness.   Respiratory: Negative for chest tightness, cough and sputum production.    Gastrointestinal: Negative for abdominal pain, nausea, vomiting and diarrhea.   Musculoskeletal: Negative for muscle ache.   Skin: Negative for rash.   Allergic/Immunologic: Negative for seasonal allergies.   Neurological: Positive for headaches.   Hematologic/Lymphatic: " Positive for swollen lymph nodes.       Objective:      Physical Exam   Constitutional: She is oriented to person, place, and time. She appears well-developed and well-nourished. No distress.   HENT:   Head: Normocephalic and atraumatic.   Right Ear: Tympanic membrane, external ear and ear canal normal.   Left Ear: Tympanic membrane, external ear and ear canal normal.   Nose: Mucosal edema present. Right sinus exhibits no maxillary sinus tenderness and no frontal sinus tenderness. Left sinus exhibits no maxillary sinus tenderness and no frontal sinus tenderness.   Mouth/Throat: Uvula is midline and mucous membranes are normal. No oral lesions. No trismus in the jaw. No uvula swelling. Posterior oropharyngeal edema and posterior oropharyngeal erythema present. No tonsillar abscesses. Tonsils are 1+ on the right. Tonsils are 1+ on the left. Tonsillar exudate (Right).   Eyes: Pupils are equal, round, and reactive to light. Conjunctivae, EOM and lids are normal.   Neck: Normal range of motion. Neck supple.   Cardiovascular: Normal rate, regular rhythm and normal heart sounds.   Pulmonary/Chest: Effort normal and breath sounds normal. No respiratory distress.   Abdominal: Soft. Normal appearance and bowel sounds are normal. She exhibits no distension and no mass. There is no tenderness.   Musculoskeletal: Normal range of motion.   Lymphadenopathy:     She has cervical adenopathy.   Neurological: She is alert and oriented to person, place, and time. She has normal strength. No cranial nerve deficit or sensory deficit.   Skin: Skin is warm. Capillary refill takes less than 2 seconds.   Psychiatric: She has a normal mood and affect. Her speech is normal and behavior is normal. Judgment and thought content normal. Cognition and memory are normal.   Nursing note and vitals reviewed.      Assessment:       1. Acute pharyngitis, unspecified etiology        Plan:         Acute pharyngitis, unspecified etiology  -      amoxicillin-clavulanate 875-125mg (AUGMENTIN) 875-125 mg per tablet; Take 1 tablet by mouth every 12 (twelve) hours. for 10 days  Dispense: 20 tablet; Refill: 0  -     predniSONE (DELTASONE) 10 MG tablet; Take 1 tablet (10 mg total) by mouth once daily. for 4 days  Dispense: 4 tablet; Refill: 0    Other orders  -     fluconazole (DIFLUCAN) 150 MG Tab; Take 1 tablet (150 mg total) by mouth once daily. for 1 day  Dispense: 1 tablet; Refill: 0      Patient Instructions   1.  Take all medications as directed. If you have been prescribed antibiotics, make sure to complete them.   2.  Make sure to get plenty of rest. (This is the best way to help your immune system fight illness.)   3.  Keep yourself/patient well hydrated. For adults, it is recommended to drink at least 8-10 glasses of water daily.   4.  Perform warm, salt water gargles to help reduce inflammation and throat discomfort. Chloraseptic sprays and throat lozenges will also help with your throat pain.  5. Change your toothbrush 24 hours after starting antibiotics to prevent reinfection.  6.  For patients above 6 months of age who are not allergic to and are not on anticoagulants, you can alternate Tylenol and Motrin every 4-6 hours for fever above 100.4F and/or pain.  For patients less than 6 months of age, allergic to or intolerant to NSAIDS, have gastritis, gastric ulcers, or history of GI bleeds, are pregnant, or are on anticoagulant therapy, you can take Tylenol every 4 hours as needed for fever above 100.4F and/or pain.   7. You should schedule a follow-up appointment with your Primary Care Provider/Pediatrician for recheck in 2-3 days or as directed at this visit.   8.  If your condition fails to improve in a timely manner, you should receive another evaluation by your Primary Care Provider/Pediatrician to discuss your concerns or return to urgent care for a recheck.  If your condition worsens at any time, you should report immediately to your nearest  Emergency Department for further evaluation. **You must understand that you have received Urgent Care treatment only and that you may be released before all of your medical problems are known or treated. You, the patient, are responsible to arrange for follow-up care as instructed.           Pharyngitis: Strep (Presumed)    You have pharyngitis (sore throat). The cause is thought to be the streptococcus, or strep, bacterium. Strep throat infection can cause throat pain that is worse when swallowing, aching all over, headache, and fever. The infection may be spread by coughing, kissing, or touching others after touching your mouth or nose. Antibiotic medications are given to treat the infection.  Home care  · Rest at home. Drink plenty of fluids to avoid dehydration.  · No work or school for the first 2 days of taking the antibiotics. After this time, you will not be contagious. You can then return to work or school if you are feeling better.   · The antibiotic medication must be taken for the full 10 days, even if you feel better. This is very important to ensure the infection is treated. It is also important to prevent drug-resistant organisms from developing. If you were given an antibiotic shot, no more antibiotics are needed.  · You may use acetaminophen or ibuprofen to control pain or fever, unless another medicine was prescribed for this. If you have chronic liver or kidney disease or ever had a stomach ulcer or GI bleeding, talk with your doctor before using these medicines.  · Throat lozenges or a throat-numbing sprays can help reduce throat pain. Gargling with warm salt water can also help. Dissolve 1/2 teaspoon of salt in 1 8 ounce glass of warm water.   · Avoid salty or spicy foods, which can irritate the throat.  Follow-up care  Follow up with your healthcare provider or our staff if you are not improving over the next week.  When to seek medical advice  Call your healthcare provider right away if any of  these occur:  · Fever as directed by your doctor.   · New or worsening ear pain, sinus pain, or headache  · Painful lumps in the back of neck  · Stiff neck  · Lymph nodes are getting larger  · Inability to swallow liquids, excessive drooling, or inability to open mouth wide due to throat pain  · Signs of dehydration (very dark urine or no urine, sunken eyes, dizziness)  · Trouble breathing or noisy breathing  · Muffled voice  · New rash  Date Last Reviewed: 4/13/2015  © 9794-6352 [employer]. [address] [place], [place] [address]. All rights reserved. This information is not intended as a substitute for professional medical care. Always follow your healthcare professional's instructions.

## 2019-09-18 NOTE — LETTER
September 18, 2019      [facility]  [address], [address]  [place] [address]  Phone: [phone]  Fax: [phone]       Patient: Sharyn Hale   YOB: 1995  Date of Visit: 09/18/2019    To Whom It May Concern:    Angela Hale  was at [facility] on 09/18/2019. She may return to work/school on 09/20/2019 with no restrictions. If you have any questions or concerns, or if I can be of further assistance, please do not hesitate to contact me.    Sincerely,      Paulina Hale PA-C

## 2019-12-12 ENCOUNTER — OFFICE VISIT (OUTPATIENT)
Dept: URGENT CARE | Facility: CLINIC | Age: 24
End: 2019-12-12
Payer: COMMERCIAL

## 2019-12-12 VITALS
HEART RATE: 92 BPM | TEMPERATURE: 98 F | BODY MASS INDEX: 19.2 KG/M2 | DIASTOLIC BLOOD PRESSURE: 64 MMHG | WEIGHT: 105 LBS | SYSTOLIC BLOOD PRESSURE: 113 MMHG | RESPIRATION RATE: 18 BRPM | OXYGEN SATURATION: 100 %

## 2019-12-12 DIAGNOSIS — J06.9 UPPER RESPIRATORY TRACT INFECTION, UNSPECIFIED TYPE: Primary | ICD-10-CM

## 2019-12-12 LAB
CTP QC/QA: YES
FLUAV AG NPH QL: NEGATIVE
FLUBV AG NPH QL: NEGATIVE

## 2019-12-12 PROCEDURE — 99214 OFFICE O/P EST MOD 30 MIN: CPT | Mod: S$GLB,,, | Performed by: NURSE PRACTITIONER

## 2019-12-12 PROCEDURE — 87804 INFLUENZA ASSAY W/OPTIC: CPT | Mod: QW,S$GLB,, | Performed by: NURSE PRACTITIONER

## 2019-12-12 PROCEDURE — 99214 PR OFFICE/OUTPT VISIT, EST, LEVL IV, 30-39 MIN: ICD-10-PCS | Mod: S$GLB,,, | Performed by: NURSE PRACTITIONER

## 2019-12-12 PROCEDURE — 87804 POCT INFLUENZA A/B: ICD-10-PCS | Mod: QW,S$GLB,, | Performed by: NURSE PRACTITIONER

## 2019-12-12 RX ORDER — PREDNISONE 20 MG/1
20 TABLET ORAL DAILY
Qty: 5 TABLET | Refills: 0 | Status: SHIPPED | OUTPATIENT
Start: 2019-12-12 | End: 2019-12-17

## 2019-12-12 RX ORDER — FLUTICASONE PROPIONATE 50 MCG
1 SPRAY, SUSPENSION (ML) NASAL DAILY
Qty: 1 BOTTLE | Refills: 0 | Status: SHIPPED | OUTPATIENT
Start: 2019-12-12 | End: 2022-04-05

## 2019-12-12 NOTE — PROGRESS NOTES
Subjective:       Patient ID: Sharyn Hale is a 24 y.o. female.    Vitals:  weight is 47.6 kg (105 lb). Her oral temperature is 97.9 °F (36.6 °C). Her blood pressure is 113/64 and her pulse is 113 (abnormal). Her respiration is 18 and oxygen saturation is 100%.     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  No fever or purulent discharge.    No significant past medical history    History reviewed. No pertinent surgical history.    Sinus Problem   This is a new problem. The current episode started yesterday. The problem has been gradually worsening since onset. There has been no fever. Associated symptoms include coughing, diaphoresis, headaches, neck pain (left side of neck) and sinus pressure. Pertinent negatives include no chills, congestion, ear pain or sore throat. (Ear congestion  ) Past treatments include nothing.       Constitution: Positive for sweating. Negative for chills, fatigue and fever.   HENT: Positive for sinus pressure. Negative for ear pain, ear discharge, congestion, sinus pain, sore throat and voice change.    Neck: Positive for neck pain (left side of neck) and painful lymph nodes. Negative for neck stiffness and neck swelling.   Cardiovascular: Negative for chest pain.   Eyes: Negative for eye redness.   Respiratory: Positive for cough. Negative for chest tightness and sputum production.    Gastrointestinal: Negative for nausea, vomiting and diarrhea.   Genitourinary: Negative for dysuria, frequency, urgency and urine decreased.   Musculoskeletal: Negative for muscle ache.   Skin: Negative for rash.   Allergic/Immunologic: Negative for seasonal allergies and immunocompromised state.   Neurological: Positive for headaches. Negative for dizziness, light-headedness and altered mental status.   Hematologic/Lymphatic: Positive for swollen lymph nodes.    Psychiatric/Behavioral: Negative for altered mental status and confusion.       Objective:      Physical Exam   Constitutional: She is oriented to person, place, and time. She appears well-developed and well-nourished. She is cooperative.  Non-toxic appearance. She appears ill. No distress.   HENT:   Head: Normocephalic and atraumatic.   Right Ear: Hearing, external ear and ear canal normal. A middle ear effusion is present.   Left Ear: Hearing, external ear and ear canal normal. A middle ear effusion is present.   Nose: Mucosal edema and rhinorrhea present. No nasal deformity. No epistaxis. Right sinus exhibits no maxillary sinus tenderness and no frontal sinus tenderness. Left sinus exhibits no maxillary sinus tenderness and no frontal sinus tenderness.   Mouth/Throat: Uvula is midline, oropharynx is clear and moist and mucous membranes are normal. No trismus in the jaw. Normal dentition. No uvula swelling. No oropharyngeal exudate, posterior oropharyngeal edema or posterior oropharyngeal erythema.   Eyes: Conjunctivae and lids are normal. No scleral icterus.   Neck: Trachea normal, full passive range of motion without pain and phonation normal. Neck supple. No neck rigidity. No edema and no erythema present.   Cardiovascular: Normal rate, regular rhythm, normal heart sounds, intact distal pulses and normal pulses.   Pulmonary/Chest: Effort normal and breath sounds normal. No respiratory distress. She has no decreased breath sounds. She has no wheezes. She has no rhonchi. She has no rales.   Abdominal: Normal appearance.   Musculoskeletal: Normal range of motion. She exhibits no edema or deformity.   Lymphadenopathy:     She has cervical adenopathy.        Left cervical: Superficial cervical adenopathy present.   Neurological: She is alert and oriented to person, place, and time. She exhibits normal muscle tone. Coordination normal.   Skin: Skin is warm, dry, intact, not diaphoretic and not pale.   Psychiatric:  She has a normal mood and affect. Her speech is normal and behavior is normal. Judgment and thought content normal. Cognition and memory are normal.   Nursing note and vitals reviewed.        Assessment:       1. Upper respiratory tract infection, unspecified type        Plan:         Upper respiratory tract infection, unspecified type  -     predniSONE (DELTASONE) 20 MG tablet; Take 1 tablet (20 mg total) by mouth once daily. for 5 days  Dispense: 5 tablet; Refill: 0  -     fluticasone propionate (FLONASE) 50 mcg/actuation nasal spray; 1 spray (50 mcg total) by Each Nostril route once daily.  Dispense: 1 Bottle; Refill: 0      Patient Instructions     Viral Upper Respiratory Illness (Adult)  You have a viral upper respiratory illness (URI), which is another term for the common cold. This illness is contagious during the first few days. It is spread through the air by coughing and sneezing. It may also be spread by direct contact (touching the sick person and then touching your own eyes, nose, or mouth). Frequent handwashing will decrease risk of spread. Most viral illnesses go away within 7 to 10 days with rest and simple home remedies. Sometimes the illness may last for several weeks. Antibiotics will not kill a virus, and they are generally not prescribed for this condition.    Home care  · If symptoms are severe, rest at home for the first 2 to 3 days. When you resume activity, don't let yourself get too tired.  · Avoid being exposed to cigarette smoke (yours or others).  · You may use acetaminophen or ibuprofen to control pain and fever, unless another medicine was prescribed. (Note: If you have chronic liver or kidney disease, have ever had a stomach ulcer or gastrointestinal bleeding, or are taking blood-thinning medicines, talk with your healthcare provider before using these medicines.) Aspirin should never be given to anyone under 18 years of age who is ill with a viral infection or fever. It may cause  severe liver or brain damage.  · Your appetite may be poor, so a light diet is fine. Avoid dehydration by drinking 6 to 8 glasses of fluids per day (water, soft drinks, juices, tea, or soup). Extra fluids will help loosen secretions in the nose and lungs.  · Over-the-counter cold medicines will not shorten the length of time youre sick, but they may be helpful for the following symptoms: cough, sore throat, and nasal and sinus congestion. (Note: Do not use decongestants if you have high blood pressure.)  Follow-up care  Follow up with your healthcare provider, or as advised.  When to seek medical advice  Call your healthcare provider right away if any of these occur:  · Cough with lots of colored sputum (mucus)  · Severe headache; face, neck, or ear pain  · Difficulty swallowing due to throat pain  · Fever of 100.4°F (38°C)  Call 911, or get immediate medical care  Call emergency services right away if any of these occur:  · Chest pain, shortness of breath, wheezing, or difficulty breathing  · Coughing up blood  · Inability to swallow due to throat pain  Date Last Reviewed: 9/13/2015  © 5154-3506 [employer]. [address] [place] [address]. All rights reserved. This information is not intended as a substitute for professional medical care. Always follow your healthcare professional's instructions.        Local Lymph Node Swelling in the Neck, No Antibiotic Treatment    You have a swollen lymph node in your neck that is not infected. The lymph nodes are part of the immune system. They are found under the jaw and along the side of the neck, in the armpits, and in the groin. A nearby infection or inflammation causes the lymph nodes in that area to swell. They may also be mildly tender. This is normal.  Antibiotics are not used for a swollen lymph node that is not infected. You can use hot compresses and pain medicine to treat this condition. The pain will get better over the next 7 to 10 days.  The swelling may take several months to go away.  Rarely, a bacterial infection occurs inside the lymph node, itself. When this happens, the lymph node becomes very painful and the nearby skin gets red and warm. You may also have a fever. If this happens, call your healthcare provider. You may need to take antibiotics. You may also need to have the lymph node drained.  Home care  Follow these guidelines when caring for yourself at home:  · Make a hot compress by running warm water over a wash cloth. Put the compress on the sore area until the compress cools off. Repeat this for 20 minutes. Use the compress 3 times a day for the first 3 days, or until the pain and redness begin to get better. The heat will make more blood flow to the area and speed the healing process.  · You may use acetaminophen or ibuprofen to control pain and fever, unless another medicine was prescribed for this. Dont use ibuprofen in children under 6 months of age. If you have chronic liver or kidney disease, talk with your healthcare provider before using these medicines. Also talk with your provider if youve had a stomach ulcer or GI bleeding. Dont give aspirin to anyone under 18 years of age who is ill with a fever. It may cause severe liver damage.  Follow-up care  Follow up with your healthcare provider, or as advised.  When to seek medical advice  Call your healthcare provider right away if any of these occur:  · Redness over the lymph node  · Swelling or pain in the lymph node gets worse  · Lymph node is getting soft in the middle  · Pus or fluid drains from the lymph node  · You have trouble breathing or swallowing  · Fever of 100.4°F (38°C) or higher, or as directed by your healthcare provider  · You have questions or concerns   Date Last Reviewed: 2/1/2017  © 3090-3247 [employer]. [address] [place] [address], [place] [address]. All rights reserved. This information is not intended as a substitute for professional medical  care. Always follow your healthcare professional's instructions.      Upper Respiratory Infections    The common cold/ viral upper respiratory infection is an acute, self-limiting infection of the upper respiratory tract characterized by variable degrees of sneezing, nasal congestion and discharge (rhinorrhea), sore throat, cough, low grade fever, headache, and malaise.    Symptoms usually peak on day 2 to 3 of illness and then gradually improve over 7 to 14 days.  A cough may linger for 3 to 4 weeks but should steadily improve over time.       The following information is provided to help you in treating upper respiratory infections.    Decongestant Nasal Sprays  Over-the-counter decongestant nasal spray such as Afrin, may be helpful as an initial step in treating upper respiratory infections. This spray can be used for up to approximately 3 days and is used no more than twice per day. Topical nasal decongestant spray for longer than 5 days will result in a physical addiction, in which the nasal lining will become significantly swollen and irritated until the spray is used again.     Nasal Saline  Nasal saline is available over the counter. There are several different commercial preparations such as Ocean spray and Ayr spray. There is no limit on the use of Nasal saline. Saline is used by snorting the mist up into the nose then later gently blowing the nose to get rid of any secretions that it has loosened.    Nasal Steroids  Nasal steroid medications such as Flonase are useful for upper respiratory infections, allergies, and sensitivities to airborne irritants. Unfortunately, they do not begin to work for 1-2 days, and they do not reach their maximum benefit for approximately 2-3 weeks. Initial therapy is typically 2 puffs per nostril twice per day. This should be used for only a few days, then the maintenance dosage is one or two puffs per nostril once per day. This can be done at any time of the day. The most  effective way to use any nasal medication is to look down at your toes when spraying it in. Aim slightly away from the septum (dividing plate between the nostrils), and gently inhale. This ensures that the spray will go into the sinus cavities and not straight up into the nose. A good way to avoid spraying onto the septum is to use the right hand to spray into the left nostril, and vice versa for the right nostril. Occasionally, nasal steroids can increase the risk of nosebleeds, but in general they are very well tolerated and effective medications.    Antihistamines  Antihistamines are available both over-the-counter and as a prescription. There are also various decongestant and antihistamine combinations available such as Claritin, Allegra, and Zyrtec. It is best to take any antihistamine-decongestant combination in the morning to avoid insomnia. Zyrtec should probably be taken at night, in order to reduce the chance of sleepiness during the daytime. If there is a significant infection present and secretions are already thickened, it is recommended to discontinue antihistamines and use a mucous thinner/decongestant combination.    Mucous Thinners and Decongestants  Mucous thinners and decongestants are used to shrink down the tissues and promote sinus drainage. There are multiple prescription and over-the-counter varieties available. A mucous thinner will tend to be drying unless you are also drinking plenty of water when taking these. If you have high blood pressure, it is very important to monitor your pressure while on decongestants. The mucous thinner/decongestant combinations are typically given twice per day. However, some people will be unable to tolerate these at night and should only take them once per day.    Oral Steroids  Oral steroids can be used with more sever infections. Often, they are the only medications that will reduce the symptoms of pressure and allow the nasal sinuses to drain. These are  best taken on a full stomach and earlier in the day is better. They may give you some irritability, stomach upset, or hyperactivity. This can also interfere with sleep. A person who has high blood pressure or diabetes needs to be very careful to monitor their pressure or blood glucose while taking steroids. Steroids can have multiple side effects when taken long-term, but short-term doses are very well tolerated and extremely effective in controlling the symptoms associated with acute and chronic sinus infections, severe allergies, or nasal polyps. The only significant side effect of note with oral steroids is the extraordinarily uncommon occurrence of damage to the hip cartilage, which is very rare and is usually associated with long-term usage of steroids. The use of steroids for greater than approximately seven days requires a tapering down in order to discontinue them. You should not abruptly stop your steroid if you have been taking the same dose for greater than one week.    Antibiotic Treatment  Finally, when all of these other measures have failed, and a bacterial infection is present, an antibiotic will be prescribed. The most common symptoms of acute sinusitis of a bacterial nature are pain, pressure, and thick and colored nasal drainage. However, not all colored drainage means that there is a bacterial infection present. According to the Center for Disease Control, only 2% of colds will progress to result in bacterial sinusitis. Most upper respiratory infections should NOT be treated with antibiotics. Antibiotics should be reserved for upper respiratory infections which last longer than 10 days, or which worsen after 4 or 5 days of treatment. The use of antibiotics for nonbacterial upper respiratory infections has resulted in a severe problem with the emergence of bacteria which are resistant to multiple forms of antibiotics, and some bacteria are currently only treatable with intravenous  antibiotics.    Body Aches/Pains/Fever  For patients who are not allergic to and are not on anticoagulants, you can alternate Tylenol and Motrin every 4-6 hours for fever above 100.4F and/or pain.  For patients who are allergic or intolerant to NSAIDS, have gastritis, gastric ulcers, or history of GI bleeds, are pregnant, or are on anticoagulant therapy, you can take Tylenol every 4 hours as needed for fever above 100.4F and/or pain.     Maintain adequate hydration -  Rest and keep yourself/patient well hydrated. For adults, it is recommended to drink at least 8-10 glasses of water daily.  This may help thin secretions and soothe the respiratory mucosa.     Drink Hot Liquids (coffee, water, tea, hot chocolate or soup). Put liquid in a Mug and place in Microwave for 2 to 3 minutes. CHANGE THE CUP THAT WAS USED IN THE MICROWAVE SO AS NOT TO BURN YOUR MOUTH.  Sniff the steam from the cup and sip the heated liquid TEN TO TWELVE TIMES A DAY for 5 to 7 Days.    COUGH  A viral cough may linger for 3 to 4 weeks but should steadily improve over time.   Coughing is the body's natural way to clear mucus and help get rid of bacteria and viruses. Therefore, cough suppressants are usually not recomended.  Common cough suppressants include syrups with the ingredient dextromethorphan or DM, available over-the-counter, or prescription syrups containing codeine or hydrocone.  There is no proven benefit and have potential harms.       ?Honey may be beneficial, especially on nocturnal cough.   1 to 2 teaspoons can be taken straight or diluted in tea, juice or other liquid.    The antioxidants in honey are an important contributor to its decongestant properties. Generally speaking, darker honey contains more antioxidants. Buckwheat and avocado honey are particularly good choices. If these honeys are not available in your area, choose the darkest honey you can find.        1.  Take all medications as directed. If you have been prescribed  antibiotics, make sure to complete them.   2.  Rest and keep yourself/patient well hydrated. For adults, it is recommended to drink at least 8-10 glasses of water daily.   3.  For patients above 6 months of age who are not allergic to and are not on anticoagulants, you can alternate Tylenol and Motrin every 4-6 hours for fever above 100.4F and/or pain.  For patients less than 6 months of age, allergic to or intolerant to NSAIDS, have gastritis, gastric ulcers, or history of GI bleeds, are pregnant, or are on anticoagulant therapy, you can take Tylenol every 4 hours as needed for fever above 100.4F and/or pain.   4. You should schedule a follow-up appointment with your Primary Care Provider/Pediatrician for recheck in 2-3 days or as directed at this visit.   5.  If your condition fails to improve in a timely manner, you should receive another evaluation by your Primary Care Provider/Pediatrician to discuss your concerns or return to urgent care for a recheck.  If your condition worsens at any time, you should report immediately to your nearest Emergency Department for further evaluation. **You must understand that you have received Urgent Care treatment only and that you may be released before all of your medical problems are known or treated. You, the patient, are responsible to arrange for follow-up care as instructed.

## 2019-12-12 NOTE — PATIENT INSTRUCTIONS
Viral Upper Respiratory Illness (Adult)  You have a viral upper respiratory illness (URI), which is another term for the common cold. This illness is contagious during the first few days. It is spread through the air by coughing and sneezing. It may also be spread by direct contact (touching the sick person and then touching your own eyes, nose, or mouth). Frequent handwashing will decrease risk of spread. Most viral illnesses go away within 7 to 10 days with rest and simple home remedies. Sometimes the illness may last for several weeks. Antibiotics will not kill a virus, and they are generally not prescribed for this condition.    Home care  · If symptoms are severe, rest at home for the first 2 to 3 days. When you resume activity, don't let yourself get too tired.  · Avoid being exposed to cigarette smoke (yours or others).  · You may use acetaminophen or ibuprofen to control pain and fever, unless another medicine was prescribed. (Note: If you have chronic liver or kidney disease, have ever had a stomach ulcer or gastrointestinal bleeding, or are taking blood-thinning medicines, talk with your healthcare provider before using these medicines.) Aspirin should never be given to anyone under 18 years of age who is ill with a viral infection or fever. It may cause severe liver or brain damage.  · Your appetite may be poor, so a light diet is fine. Avoid dehydration by drinking 6 to 8 glasses of fluids per day (water, soft drinks, juices, tea, or soup). Extra fluids will help loosen secretions in the nose and lungs.  · Over-the-counter cold medicines will not shorten the length of time youre sick, but they may be helpful for the following symptoms: cough, sore throat, and nasal and sinus congestion. (Note: Do not use decongestants if you have high blood pressure.)  Follow-up care  Follow up with your healthcare provider, or as advised.  When to seek medical advice  Call your healthcare provider right away if any  of these occur:  · Cough with lots of colored sputum (mucus)  · Severe headache; face, neck, or ear pain  · Difficulty swallowing due to throat pain  · Fever of 100.4°F (38°C)  Call 911, or get immediate medical care  Call emergency services right away if any of these occur:  · Chest pain, shortness of breath, wheezing, or difficulty breathing  · Coughing up blood  · Inability to swallow due to throat pain  Date Last Reviewed: 9/13/2015 © 2000-2017 [employer]. [address] [place] [address]. All rights reserved. This information is not intended as a substitute for professional medical care. Always follow your healthcare professional's instructions.        Local Lymph Node Swelling in the Neck, No Antibiotic Treatment    You have a swollen lymph node in your neck that is not infected. The lymph nodes are part of the immune system. They are found under the jaw and along the side of the neck, in the armpits, and in the groin. A nearby infection or inflammation causes the lymph nodes in that area to swell. They may also be mildly tender. This is normal.  Antibiotics are not used for a swollen lymph node that is not infected. You can use hot compresses and pain medicine to treat this condition. The pain will get better over the next 7 to 10 days. The swelling may take several months to go away.  Rarely, a bacterial infection occurs inside the lymph node, itself. When this happens, the lymph node becomes very painful and the nearby skin gets red and warm. You may also have a fever. If this happens, call your healthcare provider. You may need to take antibiotics. You may also need to have the lymph node drained.  Home care  Follow these guidelines when caring for yourself at home:  · Make a hot compress by running warm water over a wash cloth. Put the compress on the sore area until the compress cools off. Repeat this for 20 minutes. Use the compress 3 times a day for the first 3 days, or until  the pain and redness begin to get better. The heat will make more blood flow to the area and speed the healing process.  · You may use acetaminophen or ibuprofen to control pain and fever, unless another medicine was prescribed for this. Dont use ibuprofen in children under 6 months of age. If you have chronic liver or kidney disease, talk with your healthcare provider before using these medicines. Also talk with your provider if youve had a stomach ulcer or GI bleeding. Dont give aspirin to anyone under 18 years of age who is ill with a fever. It may cause severe liver damage.  Follow-up care  Follow up with your healthcare provider, or as advised.  When to seek medical advice  Call your healthcare provider right away if any of these occur:  · Redness over the lymph node  · Swelling or pain in the lymph node gets worse  · Lymph node is getting soft in the middle  · Pus or fluid drains from the lymph node  · You have trouble breathing or swallowing  · Fever of 100.4°F (38°C) or higher, or as directed by your healthcare provider  · You have questions or concerns   Date Last Reviewed: 2/1/2017  © 4585-2009 [employer]. [address] [place] [address]. All rights reserved. This information is not intended as a substitute for professional medical care. Always follow your healthcare professional's instructions.      Upper Respiratory Infections    The common cold/ viral upper respiratory infection is an acute, self-limiting infection of the upper respiratory tract characterized by variable degrees of sneezing, nasal congestion and discharge (rhinorrhea), sore throat, cough, low grade fever, headache, and malaise.    Symptoms usually peak on day 2 to 3 of illness and then gradually improve over 7 to 14 days.  A cough may linger for 3 to 4 weeks but should steadily improve over time.       The following information is provided to help you in treating upper respiratory infections.    Decongestant  Nasal Sprays  Over-the-counter decongestant nasal spray such as Afrin, may be helpful as an initial step in treating upper respiratory infections. This spray can be used for up to approximately 3 days and is used no more than twice per day. Topical nasal decongestant spray for longer than 5 days will result in a physical addiction, in which the nasal lining will become significantly swollen and irritated until the spray is used again.     Nasal Saline  Nasal saline is available over the counter. There are several different commercial preparations such as Ocean spray and Ayr spray. There is no limit on the use of Nasal saline. Saline is used by snorting the mist up into the nose then later gently blowing the nose to get rid of any secretions that it has loosened.    Nasal Steroids  Nasal steroid medications such as Flonase are useful for upper respiratory infections, allergies, and sensitivities to airborne irritants. Unfortunately, they do not begin to work for 1-2 days, and they do not reach their maximum benefit for approximately 2-3 weeks. Initial therapy is typically 2 puffs per nostril twice per day. This should be used for only a few days, then the maintenance dosage is one or two puffs per nostril once per day. This can be done at any time of the day. The most effective way to use any nasal medication is to look down at your toes when spraying it in. Aim slightly away from the septum (dividing plate between the nostrils), and gently inhale. This ensures that the spray will go into the sinus cavities and not straight up into the nose. A good way to avoid spraying onto the septum is to use the right hand to spray into the left nostril, and vice versa for the right nostril. Occasionally, nasal steroids can increase the risk of nosebleeds, but in general they are very well tolerated and effective medications.    Antihistamines  Antihistamines are available both over-the-counter and as a prescription. There are  also various decongestant and antihistamine combinations available such as Claritin, Allegra, and Zyrtec. It is best to take any antihistamine-decongestant combination in the morning to avoid insomnia. Zyrtec should probably be taken at night, in order to reduce the chance of sleepiness during the daytime. If there is a significant infection present and secretions are already thickened, it is recommended to discontinue antihistamines and use a mucous thinner/decongestant combination.    Mucous Thinners and Decongestants  Mucous thinners and decongestants are used to shrink down the tissues and promote sinus drainage. There are multiple prescription and over-the-counter varieties available. A mucous thinner will tend to be drying unless you are also drinking plenty of water when taking these. If you have high blood pressure, it is very important to monitor your pressure while on decongestants. The mucous thinner/decongestant combinations are typically given twice per day. However, some people will be unable to tolerate these at night and should only take them once per day.    Oral Steroids  Oral steroids can be used with more sever infections. Often, they are the only medications that will reduce the symptoms of pressure and allow the nasal sinuses to drain. These are best taken on a full stomach and earlier in the day is better. They may give you some irritability, stomach upset, or hyperactivity. This can also interfere with sleep. A person who has high blood pressure or diabetes needs to be very careful to monitor their pressure or blood glucose while taking steroids. Steroids can have multiple side effects when taken long-term, but short-term doses are very well tolerated and extremely effective in controlling the symptoms associated with acute and chronic sinus infections, severe allergies, or nasal polyps. The only significant side effect of note with oral steroids is the extraordinarily uncommon occurrence of  damage to the hip cartilage, which is very rare and is usually associated with long-term usage of steroids. The use of steroids for greater than approximately seven days requires a tapering down in order to discontinue them. You should not abruptly stop your steroid if you have been taking the same dose for greater than one week.    Antibiotic Treatment  Finally, when all of these other measures have failed, and a bacterial infection is present, an antibiotic will be prescribed. The most common symptoms of acute sinusitis of a bacterial nature are pain, pressure, and thick and colored nasal drainage. However, not all colored drainage means that there is a bacterial infection present. According to the Center for Disease Control, only 2% of colds will progress to result in bacterial sinusitis. Most upper respiratory infections should NOT be treated with antibiotics. Antibiotics should be reserved for upper respiratory infections which last longer than 10 days, or which worsen after 4 or 5 days of treatment. The use of antibiotics for nonbacterial upper respiratory infections has resulted in a severe problem with the emergence of bacteria which are resistant to multiple forms of antibiotics, and some bacteria are currently only treatable with intravenous antibiotics.    Body Aches/Pains/Fever  For patients who are not allergic to and are not on anticoagulants, you can alternate Tylenol and Motrin every 4-6 hours for fever above 100.4F and/or pain.  For patients who are allergic or intolerant to NSAIDS, have gastritis, gastric ulcers, or history of GI bleeds, are pregnant, or are on anticoagulant therapy, you can take Tylenol every 4 hours as needed for fever above 100.4F and/or pain.     Maintain adequate hydration -  Rest and keep yourself/patient well hydrated. For adults, it is recommended to drink at least 8-10 glasses of water daily.  This may help thin secretions and soothe the respiratory mucosa.     Drink Hot  Liquids (coffee, water, tea, hot chocolate or soup). Put liquid in a Mug and place in Microwave for 2 to 3 minutes. CHANGE THE CUP THAT WAS USED IN THE MICROWAVE SO AS NOT TO BURN YOUR MOUTH.  Sniff the steam from the cup and sip the heated liquid TEN TO TWELVE TIMES A DAY for 5 to 7 Days.    COUGH  A viral cough may linger for 3 to 4 weeks but should steadily improve over time.   Coughing is the body's natural way to clear mucus and help get rid of bacteria and viruses. Therefore, cough suppressants are usually not recomended.  Common cough suppressants include syrups with the ingredient dextromethorphan or DM, available over-the-counter, or prescription syrups containing codeine or hydrocone.  There is no proven benefit and have potential harms.       ?Honey may be beneficial, especially on nocturnal cough.   1 to 2 teaspoons can be taken straight or diluted in tea, juice or other liquid.    The antioxidants in honey are an important contributor to its decongestant properties. Generally speaking, darker honey contains more antioxidants. Buckwheat and avocado honey are particularly good choices. If these honeys are not available in your area, choose the darkest honey you can find.        1.  Take all medications as directed. If you have been prescribed antibiotics, make sure to complete them.   2.  Rest and keep yourself/patient well hydrated. For adults, it is recommended to drink at least 8-10 glasses of water daily.   3.  For patients above 6 months of age who are not allergic to and are not on anticoagulants, you can alternate Tylenol and Motrin every 4-6 hours for fever above 100.4F and/or pain.  For patients less than 6 months of age, allergic to or intolerant to NSAIDS, have gastritis, gastric ulcers, or history of GI bleeds, are pregnant, or are on anticoagulant therapy, you can take Tylenol every 4 hours as needed for fever above 100.4F and/or pain.   4. You should schedule a follow-up appointment with  your Primary Care Provider/Pediatrician for recheck in 2-3 days or as directed at this visit.   5.  If your condition fails to improve in a timely manner, you should receive another evaluation by your Primary Care Provider/Pediatrician to discuss your concerns or return to urgent care for a recheck.  If your condition worsens at any time, you should report immediately to your nearest Emergency Department for further evaluation. **You must understand that you have received Urgent Care treatment only and that you may be released before all of your medical problems are known or treated. You, the patient, are responsible to arrange for follow-up care as instructed.

## 2020-01-19 DIAGNOSIS — J06.9 UPPER RESPIRATORY TRACT INFECTION, UNSPECIFIED TYPE: ICD-10-CM

## 2020-01-20 RX ORDER — FLUTICASONE PROPIONATE 50 MCG
SPRAY, SUSPENSION (ML) NASAL
Qty: 16 G | OUTPATIENT
Start: 2020-01-20

## 2020-02-13 ENCOUNTER — OFFICE VISIT (OUTPATIENT)
Dept: URGENT CARE | Facility: CLINIC | Age: 25
End: 2020-02-13
Payer: COMMERCIAL

## 2020-02-13 VITALS
HEART RATE: 90 BPM | HEIGHT: 62 IN | BODY MASS INDEX: 19.32 KG/M2 | SYSTOLIC BLOOD PRESSURE: 112 MMHG | OXYGEN SATURATION: 97 % | DIASTOLIC BLOOD PRESSURE: 66 MMHG | TEMPERATURE: 98 F | RESPIRATION RATE: 18 BRPM | WEIGHT: 105 LBS

## 2020-02-13 DIAGNOSIS — J02.9 ACUTE PHARYNGITIS, UNSPECIFIED ETIOLOGY: Primary | ICD-10-CM

## 2020-02-13 DIAGNOSIS — R05.9 COUGH: ICD-10-CM

## 2020-02-13 LAB
CTP QC/QA: YES
FLUAV AG NPH QL: NEGATIVE
FLUBV AG NPH QL: NEGATIVE

## 2020-02-13 PROCEDURE — 99214 OFFICE O/P EST MOD 30 MIN: CPT | Mod: 25,S$GLB,, | Performed by: NURSE PRACTITIONER

## 2020-02-13 PROCEDURE — 99214 PR OFFICE/OUTPT VISIT, EST, LEVL IV, 30-39 MIN: ICD-10-PCS | Mod: 25,S$GLB,, | Performed by: NURSE PRACTITIONER

## 2020-02-13 PROCEDURE — 87804 POCT INFLUENZA A/B: ICD-10-PCS | Mod: QW,S$GLB,, | Performed by: NURSE PRACTITIONER

## 2020-02-13 PROCEDURE — 87804 INFLUENZA ASSAY W/OPTIC: CPT | Mod: QW,S$GLB,, | Performed by: NURSE PRACTITIONER

## 2020-02-13 RX ORDER — PREDNISONE 10 MG/1
10 TABLET ORAL DAILY
Qty: 5 TABLET | Refills: 0 | Status: SHIPPED | OUTPATIENT
Start: 2020-02-13 | End: 2020-02-18

## 2020-02-13 RX ORDER — CEFDINIR 300 MG/1
300 CAPSULE ORAL 2 TIMES DAILY
Qty: 14 CAPSULE | Refills: 0 | Status: SHIPPED | OUTPATIENT
Start: 2020-02-13 | End: 2020-02-20

## 2020-02-13 NOTE — LETTER
February 13, 2020      [facility]  [address], [address]  [place] [address]  Phone: [phone]  Fax: [phone]       Patient: Sharyn Hale   YOB: 1995  Date of Visit: 02/13/2020    To Whom It May Concern:    Angela Hale  was at [facility] on 02/13/2020. She may return to work/school on 2/14/20 with no restrictions. If you have any questions or concerns, or if I can be of further assistance, please do not hesitate to contact me.    Sincerely,    Emily Calix NP

## 2020-02-13 NOTE — PATIENT INSTRUCTIONS
Upper Respiratory Infections    The common cold/ viral upper respiratory infection is an acute, self-limiting infection of the upper respiratory tract characterized by variable degrees of sneezing, nasal congestion and discharge (rhinorrhea), sore throat, cough, low grade fever, headache, and malaise.    Symptoms usually peak on day 2 to 3 of illness and then gradually improve over 7 to 14 days.  A cough may linger for 3 to 4 weeks but should steadily improve over time.       The following information is provided to help you in treating upper respiratory infections.    Decongestant Nasal Sprays  Over-the-counter decongestant nasal spray such as Afrin, may be helpful as an initial step in treating upper respiratory infections. This spray can be used for up to approximately 3 days and is used no more than twice per day. Topical nasal decongestant spray for longer than 5 days will result in a physical addiction, in which the nasal lining will become significantly swollen and irritated until the spray is used again.     Nasal Saline  Nasal saline is available over the counter. There are several different commercial preparations such as Ocean spray and Ayr spray. There is no limit on the use of Nasal saline. Saline is used by snorting the mist up into the nose then later gently blowing the nose to get rid of any secretions that it has loosened.    Nasal Steroids  Nasal steroid medications such as Flonase are useful for upper respiratory infections, allergies, and sensitivities to airborne irritants. Unfortunately, they do not begin to work for 1-2 days, and they do not reach their maximum benefit for approximately 2-3 weeks. Initial therapy is typically 2 puffs per nostril twice per day. This should be used for only a few days, then the maintenance dosage is one or two puffs per nostril once per day. This can be done at any time of the day. The most effective way to use any nasal medication is to look down at your toes  when spraying it in. Aim slightly away from the septum (dividing plate between the nostrils), and gently inhale. This ensures that the spray will go into the sinus cavities and not straight up into the nose. A good way to avoid spraying onto the septum is to use the right hand to spray into the left nostril, and vice versa for the right nostril. Occasionally, nasal steroids can increase the risk of nosebleeds, but in general they are very well tolerated and effective medications.    Antihistamines  Antihistamines are available both over-the-counter and as a prescription. There are also various decongestant and antihistamine combinations available such as Claritin, Allegra, and Zyrtec. It is best to take any antihistamine-decongestant combination in the morning to avoid insomnia. Zyrtec should probably be taken at night, in order to reduce the chance of sleepiness during the daytime. If there is a significant infection present and secretions are already thickened, it is recommended to discontinue antihistamines and use a mucous thinner/decongestant combination.    Mucous Thinners and Decongestants  Mucous thinners and decongestants are used to shrink down the tissues and promote sinus drainage. There are multiple prescription and over-the-counter varieties available. A mucous thinner will tend to be drying unless you are also drinking plenty of water when taking these. If you have high blood pressure, it is very important to monitor your pressure while on decongestants. The mucous thinner/decongestant combinations are typically given twice per day. However, some people will be unable to tolerate these at night and should only take them once per day.    Oral Steroids  Oral steroids can be used with more sever infections. Often, they are the only medications that will reduce the symptoms of pressure and allow the nasal sinuses to drain. These are best taken on a full stomach and earlier in the day is better. They may  give you some irritability, stomach upset, or hyperactivity. This can also interfere with sleep. A person who has high blood pressure or diabetes needs to be very careful to monitor their pressure or blood glucose while taking steroids. Steroids can have multiple side effects when taken long-term, but short-term doses are very well tolerated and extremely effective in controlling the symptoms associated with acute and chronic sinus infections, severe allergies, or nasal polyps. The only significant side effect of note with oral steroids is the extraordinarily uncommon occurrence of damage to the hip cartilage, which is very rare and is usually associated with long-term usage of steroids. The use of steroids for greater than approximately seven days requires a tapering down in order to discontinue them. You should not abruptly stop your steroid if you have been taking the same dose for greater than one week.    Antibiotic Treatment  Finally, when all of these other measures have failed, and a bacterial infection is present, an antibiotic will be prescribed. The most common symptoms of acute sinusitis of a bacterial nature are pain, pressure, and thick and colored nasal drainage. However, not all colored drainage means that there is a bacterial infection present. According to the Center for Disease Control, only 2% of colds will progress to result in bacterial sinusitis. Most upper respiratory infections should NOT be treated with antibiotics. Antibiotics should be reserved for upper respiratory infections which last longer than 10 days, or which worsen after 4 or 5 days of treatment. The use of antibiotics for nonbacterial upper respiratory infections has resulted in a severe problem with the emergence of bacteria which are resistant to multiple forms of antibiotics, and some bacteria are currently only treatable with intravenous antibiotics.    Body Aches/Pains/Fever  For patients who are not allergic to and are not  on anticoagulants, you can alternate Tylenol and Motrin every 4-6 hours for fever above 100.4F and/or pain.  For patients who are allergic or intolerant to NSAIDS, have gastritis, gastric ulcers, or history of GI bleeds, are pregnant, or are on anticoagulant therapy, you can take Tylenol every 4 hours as needed for fever above 100.4F and/or pain.     Maintain adequate hydration -  Rest and keep yourself/patient well hydrated. For adults, it is recommended to drink at least 8-10 glasses of water daily.  This may help thin secretions and soothe the respiratory mucosa.     Sore Throat  Perform warm, salt water gargles to help reduce inflammation and throat discomfort. Chloraseptic sprays and throat lozenges will also help with your throat pain.    COUGH  A viral cough may linger for 3 to 4 weeks but should steadily improve over time. Coughing is the body's natural way to clear mucus and help get rid of bacteria and viruses. Therefore, cough suppressants are usually not recommended.  Common cough suppressants include syrups with the ingredient dextromethorphan or DM, available over-the-counter, or prescription syrups containing codeine or hydrocone.  There is no proven benefit and have potential harms.    Dextromethorphan should be avoided in patients taking a monoamine oxidase inhibitor (MAOI).    Dextromethorphan should be used with caution in patients taking serotonergic drugs (e.g., tramadol, SSRIs).     ? Honey may be beneficial, especially on nocturnal cough.  1 to 2 teaspoons can be taken straight or diluted in tea, juice or other liquid.    The antioxidants in honey are an important contributor to its decongestant properties. Generally speaking, darker honey contains more antioxidants. Buckwheat and avocado honey are particularly good choices. If these honeys are not available in your area, choose the darkest honey you can find.     Avoid Rhododendron honey products. These can contain grayanotoxin and can  lead to cardiovascular side effects (e.g., hypotension, arrhythmias, chest pain).     1.  Take all medications as directed. If you have been prescribed antibiotics, make sure to complete them.   2.  Rest and keep yourself/patient well hydrated. For adults, it is recommended to drink at least 8-10 glasses of water daily.   3.  For patients above 6 months of age who are not allergic to and are not on anticoagulants, you can alternate Tylenol and Motrin every 4-6 hours for fever above 100.4F and/or pain.  For patients less than 6 months of age, allergic to or intolerant to NSAIDS, have gastritis, gastric ulcers, or history of GI bleeds, are pregnant, or are on anticoagulant therapy, you can take Tylenol every 4 hours as needed for fever above 100.4F and/or pain.   4. You should schedule a follow-up appointment with your Primary Care Provider for recheck in 2-3 days or as directed at this visit.   5.  If your condition fails to improve in a timely manner, you should receive another evaluation by your Primary Care Provider to discuss your concerns or return to urgent care for a recheck.      **You must understand that you have received Urgent Care treatment only and that you may be released before all of your medical problems are known or treated. You, the patient, are responsible to arrange for follow-up care as instructed. If your condition worsens at any time, you should report immediately to your nearest Emergency Department for further evaluation.      YOU HAVE BEEN GIVEN A PRESCRIPTION FOR ANTIBIOTICS. IT WAS ADVISED TO DELAY THE COURSE OF ANTIBIOTICS FOR 3-5 DAYS IF SYMPTOMS DO NOT RESOLVE. IT IMPORTANT TO FOLLOW THESE INSTRUCTIONS AS ANTIBIOTIC RESISTANCE IS HIGH. YOUR SYMPTOMS WILL LIKELY RESOLVE WITHOUT THIS PRESCRIPTION.

## 2020-02-13 NOTE — PROGRESS NOTES
"Subjective:       Patient ID: Sharyn Hale is a 24 y.o. female.    Vitals:  height is 5' 2" (1.575 m) and weight is 47.6 kg (105 lb). Her temperature is 97.5 °F (36.4 °C). Her blood pressure is 112/66 and her pulse is 90. Her respiration is 18 and oxygen saturation is 97%.     Chief Complaint: Cough    Cough   Episode onset: 02/10/2020. The problem has been gradually worsening. The cough is non-productive. Associated symptoms include ear pain, headaches, myalgias, postnasal drip and a sore throat. Pertinent negatives include no chills, eye redness, fever, hemoptysis, rash, shortness of breath or wheezing. Treatments tried: nightquil. Her past medical history is significant for bronchitis.       Constitution: Negative for chills, sweating, fatigue and fever.   HENT: Positive for ear pain, postnasal drip, sinus pain, sinus pressure and sore throat. Negative for congestion and voice change.    Neck: Positive for painful lymph nodes.   Eyes: Negative for eye redness.   Respiratory: Positive for chest tightness and cough. Negative for sputum production, bloody sputum, COPD, shortness of breath, stridor, wheezing and asthma.    Gastrointestinal: Negative for nausea and vomiting.   Musculoskeletal: Positive for back pain and muscle ache.   Skin: Negative for rash.   Allergic/Immunologic: Negative for seasonal allergies and asthma.   Neurological: Positive for headaches.   Hematologic/Lymphatic: Positive for swollen lymph nodes.       Objective:      Physical Exam   Constitutional: She is oriented to person, place, and time. She appears well-developed and well-nourished. She is cooperative.  Non-toxic appearance. She appears ill. No distress.   HENT:   Head: Normocephalic and atraumatic.   Right Ear: Hearing, external ear and ear canal normal. Tympanic membrane is erythematous. A middle ear effusion is present.   Left Ear: Hearing, external ear and ear canal normal. A middle ear effusion is present.   Nose: Mucosal edema " present. No nasal deformity. No epistaxis. Right sinus exhibits no maxillary sinus tenderness. Left sinus exhibits no maxillary sinus tenderness and no frontal sinus tenderness.   Mouth/Throat: Uvula is midline and mucous membranes are normal. No trismus in the jaw. Normal dentition. No uvula swelling. Posterior oropharyngeal edema and posterior oropharyngeal erythema present. No oropharyngeal exudate.   Eyes: Conjunctivae and lids are normal. No scleral icterus.   Neck: Trachea normal, full passive range of motion without pain and phonation normal. Neck supple. No neck rigidity. No edema and no erythema present.   Cardiovascular: Normal rate, regular rhythm, normal heart sounds, intact distal pulses and normal pulses.   Pulmonary/Chest: Effort normal and breath sounds normal. No respiratory distress. She has no decreased breath sounds. She has no rhonchi.   Abdominal: Normal appearance.   Musculoskeletal: Normal range of motion. She exhibits no edema or deformity.   Neurological: She is alert and oriented to person, place, and time. She exhibits normal muscle tone. Coordination normal.   Skin: Skin is warm, dry, intact, not diaphoretic and not pale.   Psychiatric: She has a normal mood and affect. Her speech is normal and behavior is normal. Judgment and thought content normal. Cognition and memory are normal.   Nursing note and vitals reviewed.        Assessment:       1. Acute pharyngitis, unspecified etiology    2. Cough        Plan:         Acute pharyngitis, unspecified etiology  -     cefdinir (OMNICEF) 300 MG capsule; Take 1 capsule (300 mg total) by mouth 2 (two) times daily. for 7 days  Dispense: 14 capsule; Refill: 0  -     predniSONE (DELTASONE) 10 MG tablet; Take 1 tablet (10 mg total) by mouth once daily. for 5 days  Dispense: 5 tablet; Refill: 0    Cough  -     POCT Influenza A/B  -     predniSONE (DELTASONE) 10 MG tablet; Take 1 tablet (10 mg total) by mouth once daily. for 5 days  Dispense: 5  tablet; Refill: 0      Results for orders placed or performed in visit on 02/13/20   POCT Influenza A/B   Result Value Ref Range    Rapid Influenza A Ag Negative Negative    Rapid Influenza B Ag Negative Negative     Acceptable Yes         Patient Instructions   Upper Respiratory Infections    The common cold/ viral upper respiratory infection is an acute, self-limiting infection of the upper respiratory tract characterized by variable degrees of sneezing, nasal congestion and discharge (rhinorrhea), sore throat, cough, low grade fever, headache, and malaise.    Symptoms usually peak on day 2 to 3 of illness and then gradually improve over 7 to 14 days.  A cough may linger for 3 to 4 weeks but should steadily improve over time.       The following information is provided to help you in treating upper respiratory infections.    Decongestant Nasal Sprays  Over-the-counter decongestant nasal spray such as Afrin, may be helpful as an initial step in treating upper respiratory infections. This spray can be used for up to approximately 3 days and is used no more than twice per day. Topical nasal decongestant spray for longer than 5 days will result in a physical addiction, in which the nasal lining will become significantly swollen and irritated until the spray is used again.     Nasal Saline  Nasal saline is available over the counter. There are several different commercial preparations such as Ocean spray and Ayr spray. There is no limit on the use of Nasal saline. Saline is used by snorting the mist up into the nose then later gently blowing the nose to get rid of any secretions that it has loosened.    Nasal Steroids  Nasal steroid medications such as Flonase are useful for upper respiratory infections, allergies, and sensitivities to airborne irritants. Unfortunately, they do not begin to work for 1-2 days, and they do not reach their maximum benefit for approximately 2-3 weeks. Initial therapy is  typically 2 puffs per nostril twice per day. This should be used for only a few days, then the maintenance dosage is one or two puffs per nostril once per day. This can be done at any time of the day. The most effective way to use any nasal medication is to look down at your toes when spraying it in. Aim slightly away from the septum (dividing plate between the nostrils), and gently inhale. This ensures that the spray will go into the sinus cavities and not straight up into the nose. A good way to avoid spraying onto the septum is to use the right hand to spray into the left nostril, and vice versa for the right nostril. Occasionally, nasal steroids can increase the risk of nosebleeds, but in general they are very well tolerated and effective medications.    Antihistamines  Antihistamines are available both over-the-counter and as a prescription. There are also various decongestant and antihistamine combinations available such as Claritin, Allegra, and Zyrtec. It is best to take any antihistamine-decongestant combination in the morning to avoid insomnia. Zyrtec should probably be taken at night, in order to reduce the chance of sleepiness during the daytime. If there is a significant infection present and secretions are already thickened, it is recommended to discontinue antihistamines and use a mucous thinner/decongestant combination.    Mucous Thinners and Decongestants  Mucous thinners and decongestants are used to shrink down the tissues and promote sinus drainage. There are multiple prescription and over-the-counter varieties available. A mucous thinner will tend to be drying unless you are also drinking plenty of water when taking these. If you have high blood pressure, it is very important to monitor your pressure while on decongestants. The mucous thinner/decongestant combinations are typically given twice per day. However, some people will be unable to tolerate these at night and should only take them once  per day.    Oral Steroids  Oral steroids can be used with more sever infections. Often, they are the only medications that will reduce the symptoms of pressure and allow the nasal sinuses to drain. These are best taken on a full stomach and earlier in the day is better. They may give you some irritability, stomach upset, or hyperactivity. This can also interfere with sleep. A person who has high blood pressure or diabetes needs to be very careful to monitor their pressure or blood glucose while taking steroids. Steroids can have multiple side effects when taken long-term, but short-term doses are very well tolerated and extremely effective in controlling the symptoms associated with acute and chronic sinus infections, severe allergies, or nasal polyps. The only significant side effect of note with oral steroids is the extraordinarily uncommon occurrence of damage to the hip cartilage, which is very rare and is usually associated with long-term usage of steroids. The use of steroids for greater than approximately seven days requires a tapering down in order to discontinue them. You should not abruptly stop your steroid if you have been taking the same dose for greater than one week.    Antibiotic Treatment  Finally, when all of these other measures have failed, and a bacterial infection is present, an antibiotic will be prescribed. The most common symptoms of acute sinusitis of a bacterial nature are pain, pressure, and thick and colored nasal drainage. However, not all colored drainage means that there is a bacterial infection present. According to the Center for Disease Control, only 2% of colds will progress to result in bacterial sinusitis. Most upper respiratory infections should NOT be treated with antibiotics. Antibiotics should be reserved for upper respiratory infections which last longer than 10 days, or which worsen after 4 or 5 days of treatment. The use of antibiotics for nonbacterial upper respiratory  infections has resulted in a severe problem with the emergence of bacteria which are resistant to multiple forms of antibiotics, and some bacteria are currently only treatable with intravenous antibiotics.    Body Aches/Pains/Fever  For patients who are not allergic to and are not on anticoagulants, you can alternate Tylenol and Motrin every 4-6 hours for fever above 100.4F and/or pain.  For patients who are allergic or intolerant to NSAIDS, have gastritis, gastric ulcers, or history of GI bleeds, are pregnant, or are on anticoagulant therapy, you can take Tylenol every 4 hours as needed for fever above 100.4F and/or pain.     Maintain adequate hydration -  Rest and keep yourself/patient well hydrated. For adults, it is recommended to drink at least 8-10 glasses of water daily.  This may help thin secretions and soothe the respiratory mucosa.     Sore Throat  Perform warm, salt water gargles to help reduce inflammation and throat discomfort. Chloraseptic sprays and throat lozenges will also help with your throat pain.    COUGH  A viral cough may linger for 3 to 4 weeks but should steadily improve over time. Coughing is the body's natural way to clear mucus and help get rid of bacteria and viruses. Therefore, cough suppressants are usually not recommended.  Common cough suppressants include syrups with the ingredient dextromethorphan or DM, available over-the-counter, or prescription syrups containing codeine or hydrocone.  There is no proven benefit and have potential harms.    Dextromethorphan should be avoided in patients taking a monoamine oxidase inhibitor (MAOI).    Dextromethorphan should be used with caution in patients taking serotonergic drugs (e.g., tramadol, SSRIs).     ? Honey may be beneficial, especially on nocturnal cough.  1 to 2 teaspoons can be taken straight or diluted in tea, juice or other liquid.    The antioxidants in honey are an important contributor to its decongestant properties.  Generally speaking, darker honey contains more antioxidants. Buckwheat and avocado honey are particularly good choices. If these honeys are not available in your area, choose the darkest honey you can find.     Avoid Rhododendron honey products. These can contain grayanotoxin and can lead to cardiovascular side effects (e.g., hypotension, arrhythmias, chest pain).     1.  Take all medications as directed. If you have been prescribed antibiotics, make sure to complete them.   2.  Rest and keep yourself/patient well hydrated. For adults, it is recommended to drink at least 8-10 glasses of water daily.   3.  For patients above 6 months of age who are not allergic to and are not on anticoagulants, you can alternate Tylenol and Motrin every 4-6 hours for fever above 100.4F and/or pain.  For patients less than 6 months of age, allergic to or intolerant to NSAIDS, have gastritis, gastric ulcers, or history of GI bleeds, are pregnant, or are on anticoagulant therapy, you can take Tylenol every 4 hours as needed for fever above 100.4F and/or pain.   4. You should schedule a follow-up appointment with your Primary Care Provider for recheck in 2-3 days or as directed at this visit.   5.  If your condition fails to improve in a timely manner, you should receive another evaluation by your Primary Care Provider to discuss your concerns or return to urgent care for a recheck.      **You must understand that you have received Urgent Care treatment only and that you may be released before all of your medical problems are known or treated. You, the patient, are responsible to arrange for follow-up care as instructed. If your condition worsens at any time, you should report immediately to your nearest Emergency Department for further evaluation.      YOU HAVE BEEN GIVEN A PRESCRIPTION FOR ANTIBIOTICS. IT WAS ADVISED TO DELAY THE COURSE OF ANTIBIOTICS FOR 3-5 DAYS IF SYMPTOMS DO NOT RESOLVE. IT IMPORTANT TO FOLLOW THESE INSTRUCTIONS  AS ANTIBIOTIC RESISTANCE IS HIGH. YOUR SYMPTOMS WILL LIKELY RESOLVE WITHOUT THIS PRESCRIPTION.

## 2021-05-10 ENCOUNTER — PATIENT MESSAGE (OUTPATIENT)
Dept: RESEARCH | Facility: HOSPITAL | Age: 26
End: 2021-05-10

## 2021-08-23 ENCOUNTER — OFFICE VISIT (OUTPATIENT)
Dept: URGENT CARE | Facility: CLINIC | Age: 26
End: 2021-08-23
Payer: COMMERCIAL

## 2021-08-23 VITALS
HEIGHT: 63 IN | RESPIRATION RATE: 18 BRPM | TEMPERATURE: 98 F | DIASTOLIC BLOOD PRESSURE: 82 MMHG | OXYGEN SATURATION: 100 % | HEART RATE: 91 BPM | BODY MASS INDEX: 19.49 KG/M2 | SYSTOLIC BLOOD PRESSURE: 116 MMHG | WEIGHT: 110 LBS

## 2021-08-23 DIAGNOSIS — J02.9 PHARYNGITIS, UNSPECIFIED ETIOLOGY: Primary | ICD-10-CM

## 2021-08-23 DIAGNOSIS — J02.9 SORE THROAT: ICD-10-CM

## 2021-08-23 LAB
CTP QC/QA: YES
MOLECULAR STREP A: NEGATIVE

## 2021-08-23 PROCEDURE — 3074F PR MOST RECENT SYSTOLIC BLOOD PRESSURE < 130 MM HG: ICD-10-PCS | Mod: CPTII,S$GLB,, | Performed by: PHYSICIAN ASSISTANT

## 2021-08-23 PROCEDURE — 87651 POCT STREP A MOLECULAR: ICD-10-PCS | Mod: QW,S$GLB,, | Performed by: PHYSICIAN ASSISTANT

## 2021-08-23 PROCEDURE — 1160F RVW MEDS BY RX/DR IN RCRD: CPT | Mod: CPTII,S$GLB,, | Performed by: PHYSICIAN ASSISTANT

## 2021-08-23 PROCEDURE — 99214 PR OFFICE/OUTPT VISIT, EST, LEVL IV, 30-39 MIN: ICD-10-PCS | Mod: S$GLB,,, | Performed by: PHYSICIAN ASSISTANT

## 2021-08-23 PROCEDURE — 3079F PR MOST RECENT DIASTOLIC BLOOD PRESSURE 80-89 MM HG: ICD-10-PCS | Mod: CPTII,S$GLB,, | Performed by: PHYSICIAN ASSISTANT

## 2021-08-23 PROCEDURE — 3074F SYST BP LT 130 MM HG: CPT | Mod: CPTII,S$GLB,, | Performed by: PHYSICIAN ASSISTANT

## 2021-08-23 PROCEDURE — 1159F MED LIST DOCD IN RCRD: CPT | Mod: CPTII,S$GLB,, | Performed by: PHYSICIAN ASSISTANT

## 2021-08-23 PROCEDURE — 87651 STREP A DNA AMP PROBE: CPT | Mod: QW,S$GLB,, | Performed by: PHYSICIAN ASSISTANT

## 2021-08-23 PROCEDURE — 3079F DIAST BP 80-89 MM HG: CPT | Mod: CPTII,S$GLB,, | Performed by: PHYSICIAN ASSISTANT

## 2021-08-23 PROCEDURE — 1160F PR REVIEW ALL MEDS BY PRESCRIBER/CLIN PHARMACIST DOCUMENTED: ICD-10-PCS | Mod: CPTII,S$GLB,, | Performed by: PHYSICIAN ASSISTANT

## 2021-08-23 PROCEDURE — 3008F BODY MASS INDEX DOCD: CPT | Mod: CPTII,S$GLB,, | Performed by: PHYSICIAN ASSISTANT

## 2021-08-23 PROCEDURE — 1159F PR MEDICATION LIST DOCUMENTED IN MEDICAL RECORD: ICD-10-PCS | Mod: CPTII,S$GLB,, | Performed by: PHYSICIAN ASSISTANT

## 2021-08-23 PROCEDURE — 3008F PR BODY MASS INDEX (BMI) DOCUMENTED: ICD-10-PCS | Mod: CPTII,S$GLB,, | Performed by: PHYSICIAN ASSISTANT

## 2021-08-23 PROCEDURE — 99214 OFFICE O/P EST MOD 30 MIN: CPT | Mod: S$GLB,,, | Performed by: PHYSICIAN ASSISTANT

## 2021-08-23 RX ORDER — PREDNISONE 10 MG/1
10 TABLET ORAL DAILY
Qty: 5 TABLET | Refills: 0 | Status: SHIPPED | OUTPATIENT
Start: 2021-08-23 | End: 2021-08-28

## 2021-08-23 RX ORDER — AMOXICILLIN 875 MG/1
875 TABLET, FILM COATED ORAL EVERY 12 HOURS
Qty: 20 TABLET | Refills: 0 | Status: SHIPPED | OUTPATIENT
Start: 2021-08-23 | End: 2021-09-02

## 2021-08-24 ENCOUNTER — OFFICE VISIT (OUTPATIENT)
Dept: URGENT CARE | Facility: CLINIC | Age: 26
End: 2021-08-24
Payer: COMMERCIAL

## 2021-08-24 VITALS
BODY MASS INDEX: 19.49 KG/M2 | TEMPERATURE: 98 F | SYSTOLIC BLOOD PRESSURE: 118 MMHG | WEIGHT: 110 LBS | DIASTOLIC BLOOD PRESSURE: 79 MMHG | HEART RATE: 92 BPM | RESPIRATION RATE: 17 BRPM | HEIGHT: 63 IN | OXYGEN SATURATION: 99 %

## 2021-08-24 DIAGNOSIS — J02.9 PHARYNGITIS, UNSPECIFIED ETIOLOGY: Primary | ICD-10-CM

## 2021-08-24 DIAGNOSIS — Z11.59 SCREENING FOR VIRAL DISEASE: ICD-10-CM

## 2021-08-24 LAB
CTP QC/QA: YES
SARS-COV-2 RDRP RESP QL NAA+PROBE: NEGATIVE

## 2021-08-24 PROCEDURE — 3008F BODY MASS INDEX DOCD: CPT | Mod: CPTII,S$GLB,, | Performed by: NURSE PRACTITIONER

## 2021-08-24 PROCEDURE — 1160F PR REVIEW ALL MEDS BY PRESCRIBER/CLIN PHARMACIST DOCUMENTED: ICD-10-PCS | Mod: CPTII,S$GLB,, | Performed by: NURSE PRACTITIONER

## 2021-08-24 PROCEDURE — 99214 OFFICE O/P EST MOD 30 MIN: CPT | Mod: S$GLB,,, | Performed by: NURSE PRACTITIONER

## 2021-08-24 PROCEDURE — U0002: ICD-10-PCS | Mod: QW,S$GLB,, | Performed by: NURSE PRACTITIONER

## 2021-08-24 PROCEDURE — 1160F RVW MEDS BY RX/DR IN RCRD: CPT | Mod: CPTII,S$GLB,, | Performed by: NURSE PRACTITIONER

## 2021-08-24 PROCEDURE — 1159F PR MEDICATION LIST DOCUMENTED IN MEDICAL RECORD: ICD-10-PCS | Mod: CPTII,S$GLB,, | Performed by: NURSE PRACTITIONER

## 2021-08-24 PROCEDURE — 99214 PR OFFICE/OUTPT VISIT, EST, LEVL IV, 30-39 MIN: ICD-10-PCS | Mod: S$GLB,,, | Performed by: NURSE PRACTITIONER

## 2021-08-24 PROCEDURE — 3078F DIAST BP <80 MM HG: CPT | Mod: CPTII,S$GLB,, | Performed by: NURSE PRACTITIONER

## 2021-08-24 PROCEDURE — 1159F MED LIST DOCD IN RCRD: CPT | Mod: CPTII,S$GLB,, | Performed by: NURSE PRACTITIONER

## 2021-08-24 PROCEDURE — 3074F PR MOST RECENT SYSTOLIC BLOOD PRESSURE < 130 MM HG: ICD-10-PCS | Mod: CPTII,S$GLB,, | Performed by: NURSE PRACTITIONER

## 2021-08-24 PROCEDURE — U0002 COVID-19 LAB TEST NON-CDC: HCPCS | Mod: QW,S$GLB,, | Performed by: NURSE PRACTITIONER

## 2021-08-24 PROCEDURE — 3078F PR MOST RECENT DIASTOLIC BLOOD PRESSURE < 80 MM HG: ICD-10-PCS | Mod: CPTII,S$GLB,, | Performed by: NURSE PRACTITIONER

## 2021-08-24 PROCEDURE — 3008F PR BODY MASS INDEX (BMI) DOCUMENTED: ICD-10-PCS | Mod: CPTII,S$GLB,, | Performed by: NURSE PRACTITIONER

## 2021-08-24 PROCEDURE — 3074F SYST BP LT 130 MM HG: CPT | Mod: CPTII,S$GLB,, | Performed by: NURSE PRACTITIONER

## 2021-12-02 ENCOUNTER — OFFICE VISIT (OUTPATIENT)
Dept: URGENT CARE | Facility: CLINIC | Age: 26
End: 2021-12-02
Payer: COMMERCIAL

## 2021-12-02 VITALS
BODY MASS INDEX: 19.49 KG/M2 | WEIGHT: 110 LBS | DIASTOLIC BLOOD PRESSURE: 74 MMHG | TEMPERATURE: 99 F | RESPIRATION RATE: 16 BRPM | HEIGHT: 63 IN | OXYGEN SATURATION: 97 % | HEART RATE: 112 BPM | SYSTOLIC BLOOD PRESSURE: 101 MMHG

## 2021-12-02 DIAGNOSIS — J02.9 ACUTE PHARYNGITIS, UNSPECIFIED ETIOLOGY: Primary | ICD-10-CM

## 2021-12-02 DIAGNOSIS — J01.40 ACUTE NON-RECURRENT PANSINUSITIS: ICD-10-CM

## 2021-12-02 PROCEDURE — 99214 OFFICE O/P EST MOD 30 MIN: CPT | Mod: S$GLB,,, | Performed by: FAMILY MEDICINE

## 2021-12-02 PROCEDURE — 99214 PR OFFICE/OUTPT VISIT, EST, LEVL IV, 30-39 MIN: ICD-10-PCS | Mod: S$GLB,,, | Performed by: FAMILY MEDICINE

## 2021-12-02 RX ORDER — VALACYCLOVIR HYDROCHLORIDE 1 G/1
1000 TABLET, FILM COATED ORAL DAILY
COMMUNITY

## 2021-12-02 RX ORDER — DEXCHLORPHENIRAMINE MALEATE AND PSEUDOEPHEDRINE HYDROCHLORIDE 2; 60 MG/1; MG/1
1 TABLET, MULTILAYER ORAL 2 TIMES DAILY PRN
Qty: 20 TABLET | Refills: 0 | Status: SHIPPED | OUTPATIENT
Start: 2021-12-02 | End: 2022-04-05

## 2021-12-02 RX ORDER — NAPROXEN 375 MG/1
375 TABLET ORAL 2 TIMES DAILY
Qty: 20 TABLET | Refills: 0 | Status: SHIPPED | OUTPATIENT
Start: 2021-12-02 | End: 2022-04-28 | Stop reason: ALTCHOICE

## 2021-12-02 RX ORDER — CEFDINIR 300 MG/1
300 CAPSULE ORAL 2 TIMES DAILY
Qty: 20 CAPSULE | Refills: 0 | Status: SHIPPED | OUTPATIENT
Start: 2021-12-02 | End: 2021-12-12

## 2022-01-05 ENCOUNTER — OFFICE VISIT (OUTPATIENT)
Dept: URGENT CARE | Facility: CLINIC | Age: 27
End: 2022-01-05
Payer: MEDICAID

## 2022-01-05 VITALS
SYSTOLIC BLOOD PRESSURE: 107 MMHG | DIASTOLIC BLOOD PRESSURE: 62 MMHG | OXYGEN SATURATION: 98 % | HEART RATE: 100 BPM | TEMPERATURE: 99 F

## 2022-01-05 DIAGNOSIS — R11.2 NAUSEA VOMITING AND DIARRHEA: Primary | ICD-10-CM

## 2022-01-05 DIAGNOSIS — B34.9 ACUTE VIRAL SYNDROME: ICD-10-CM

## 2022-01-05 DIAGNOSIS — R19.7 NAUSEA VOMITING AND DIARRHEA: Primary | ICD-10-CM

## 2022-01-05 DIAGNOSIS — R11.10 VOMITING, INTRACTABILITY OF VOMITING NOT SPECIFIED, PRESENCE OF NAUSEA NOT SPECIFIED, UNSPECIFIED VOMITING TYPE: ICD-10-CM

## 2022-01-05 LAB
CTP QC/QA: YES
POC MOLECULAR INFLUENZA A AGN: NEGATIVE
POC MOLECULAR INFLUENZA B AGN: NEGATIVE

## 2022-01-05 PROCEDURE — 87502 POCT INFLUENZA A/B MOLECULAR: ICD-10-PCS | Mod: QW,S$GLB,, | Performed by: NURSE PRACTITIONER

## 2022-01-05 PROCEDURE — 99213 PR OFFICE/OUTPT VISIT, EST, LEVL III, 20-29 MIN: ICD-10-PCS | Mod: S$GLB,,, | Performed by: NURSE PRACTITIONER

## 2022-01-05 PROCEDURE — 3074F SYST BP LT 130 MM HG: CPT | Mod: CPTII,S$GLB,, | Performed by: NURSE PRACTITIONER

## 2022-01-05 PROCEDURE — 87502 INFLUENZA DNA AMP PROBE: CPT | Mod: QW,S$GLB,, | Performed by: NURSE PRACTITIONER

## 2022-01-05 PROCEDURE — 1160F PR REVIEW ALL MEDS BY PRESCRIBER/CLIN PHARMACIST DOCUMENTED: ICD-10-PCS | Mod: CPTII,S$GLB,, | Performed by: NURSE PRACTITIONER

## 2022-01-05 PROCEDURE — 3078F PR MOST RECENT DIASTOLIC BLOOD PRESSURE < 80 MM HG: ICD-10-PCS | Mod: CPTII,S$GLB,, | Performed by: NURSE PRACTITIONER

## 2022-01-05 PROCEDURE — 1159F PR MEDICATION LIST DOCUMENTED IN MEDICAL RECORD: ICD-10-PCS | Mod: CPTII,S$GLB,, | Performed by: NURSE PRACTITIONER

## 2022-01-05 PROCEDURE — 1160F RVW MEDS BY RX/DR IN RCRD: CPT | Mod: CPTII,S$GLB,, | Performed by: NURSE PRACTITIONER

## 2022-01-05 PROCEDURE — 1159F MED LIST DOCD IN RCRD: CPT | Mod: CPTII,S$GLB,, | Performed by: NURSE PRACTITIONER

## 2022-01-05 PROCEDURE — 99213 OFFICE O/P EST LOW 20 MIN: CPT | Mod: S$GLB,,, | Performed by: NURSE PRACTITIONER

## 2022-01-05 PROCEDURE — 3078F DIAST BP <80 MM HG: CPT | Mod: CPTII,S$GLB,, | Performed by: NURSE PRACTITIONER

## 2022-01-05 PROCEDURE — 3074F PR MOST RECENT SYSTOLIC BLOOD PRESSURE < 130 MM HG: ICD-10-PCS | Mod: CPTII,S$GLB,, | Performed by: NURSE PRACTITIONER

## 2022-01-05 RX ORDER — PROMETHAZINE HYDROCHLORIDE 25 MG/1
25 SUPPOSITORY RECTAL EVERY 8 HOURS PRN
Qty: 8 SUPPOSITORY | Refills: 0 | Status: SHIPPED | OUTPATIENT
Start: 2022-01-05

## 2022-01-05 RX ORDER — PROMETHAZINE HYDROCHLORIDE 25 MG/1
25 TABLET ORAL EVERY 8 HOURS PRN
Qty: 8 TABLET | Refills: 0 | Status: SHIPPED | OUTPATIENT
Start: 2022-01-05

## 2022-01-05 RX ORDER — DIPHENOXYLATE HYDROCHLORIDE AND ATROPINE SULFATE 2.5; .025 MG/1; MG/1
1 TABLET ORAL 4 TIMES DAILY PRN
Qty: 8 TABLET | Refills: 0 | Status: SHIPPED | OUTPATIENT
Start: 2022-01-05 | End: 2022-04-05

## 2022-01-05 NOTE — LETTER
January 5, 2022      [facility]  [address], [address]  [place] [address]  Phone: [phone]  Fax: [phone]       Patient: Sharyn Hale   YOB: 1995  Date of Visit: 01/05/2022    To Whom It May Concern:    Angela Hale  was at [facility] on 01/05/2022. The patient may return to work/school on 1/7/22 with no restrictions. If you have any questions or concerns, or if I can be of further assistance, please do not hesitate to contact me.    Sincerely,    Lainey Pickering, NP

## 2022-01-05 NOTE — PATIENT INSTRUCTIONS
·   Patient Education       Viral Gastroenteritis Discharge Instructions, Adult   About this topic   The stomach flu is also known as viral gastroenteritis. It is an infection caused by a virus. You may feel sick to your stomach or throw up. The medical terms for this are nausea and vomiting. You may also have loose stools, belly pain, or cramping. You may not be hungry. Some people have a fever or muscle aches. Viral gastroenteritis is easy to spread from person to person.           What care is needed at home?   Ask your doctor what you need to do when you go home. Make sure you ask questions if you do not understand what the doctor says. This way you will know what you need to do.  Drink small amounts of fluid every 15 to 30 minutes. Good fluids to drink are water, broth, sports drinks, and oral electrolyte solutions. It is also important to eat if you are able to keep food down.  Avoid beer, wine, and mixed drinks.  Check your blood sugar more often if you have high blood sugar.  If you are throwing up, try to drink if you can until you are able to eat small amounts of food.  If you cannot keep fluids down, suck on ice chips.  If you have loose stools, try to drink extra fluids to replace the water your body has lost.  If you are breastfeeding, keep feeding your baby as you normally would.  Avoid sharing your food and drinks.  Stay away from others until the throwing up or loose stools have stopped.  Follow good hygiene practices. Wash your hands often with soap and water for at least 20 seconds. Alcohol-based hand sanitizers also work to kill the virus. This is very important:  After using the bathroom  Before eating  Before cooking  What follow-up care is needed?   Your doctor may ask you to make visits to the office to check on your progress. Be sure to keep these visits.  What drugs may be needed?   Your doctor may not need to order drugs. Be sure to ask your doctor before you take any over-the-counter (OTC)  or other drugs and treatments.  If your signs are very bad or last for more than a few days, the doctor may order IV fluids or drugs to:  Fight an infection  Stop loose stools  Lower fever  Stop throwing up  Do not take antibiotics unless your doctor orders them.  Will physical activity be limited?   Your physical activity will not be limited. Make sure you get lots of rest. You may not be able to travel or go to work until the loose stools and throwing up have stopped for 24 hours.  What changes to diet are needed?   Take small sips of fluids often. Eat foods that have high water content like broth, Jello, and popsicles.  Avoid liquids such as soda, ginger ale, tea, fruit juice, and drinks with caffeine. These can make fluid loss worse. Ask your doctor what foods and drinks are safe for you.  You can eat a variety of food as your appetite gets better. Watch to see if some foods seem to make diarrhea or other symptoms worse, such as dairy products.  Avoid fatty and sugary foods such as cakes, chocolates, ice cream, and take out foods.  What problems could happen?   Too much fluid loss  What can be done to prevent this health problem?   Avoid close contact with people who have this illness.  Clean things and surfaces in your home like door handles and phones. Use bleach to clean the area. This can help lower the spread of infection.  Do not share personal things like toothbrushes, towels, and drinking glasses.  Take extra care when traveling. Drink bottled water only and do not eat raw foods.  Consider being vaccinated against certain viral infections. Ask your doctor about the shots that you need.  When do I need to call the doctor?   You feel very weak, like you cant stand up, and your skin is cool, clammy, or looks blue or gray.  You have severe abdominal pain.  You have chest pain or trouble breathing.  You have signs of severe fluid loss, such as:  No urine for more than 8 hours.  You feel very lightheaded or  like you are going to pass out.  You feel weak like you are going to fall.  You are not able to keep any fluids down.  You develop early signs of fluid loss again, such as:  Your urine is very dark colored.  Your mouth is dry.  You have muscle cramps.  You have a lack of energy.  You feel light-headed when you get up.  Teach Back: Helping You Understand   The Teach Back Method helps you understand the information we are giving you. The idea is simple. After talking with the staff, tell them in your own words what you were just told. This helps to make sure the staff has covered each thing clearly. It also helps to explain things that may have been a bit confusing. Before going home, make sure you are able to do these:  I can tell you about my condition.  I can tell you how often I should try to drink fluids and good kinds of fluids to drink.  I can tell you what I will do if I have trouble keeping fluids down.  Where can I learn more?   [employer]  https://familydoctor.org/condition/stomach-virus-gastroenteritis/   National [employer]est[employer] Information Clearinghouse  http://digestive.niddk.nih.gov/ddiseases/pubs/viralgastroenteritis/   Last Reviewed Date   2021-06-09  Consumer Information Use and Disclaimer   This information is not specific medical advice and does not replace information you receive from your health care provider. This is only a brief summary of general information. It does NOT include all information about conditions, illnesses, injuries, tests, procedures, treatments, therapies, discharge instructions or life-style choices that may apply to you. You must talk with your health care provider for complete information about your health and treatment options. This information should not be used to decide whether or not to accept your health care providers advice, instructions or recommendations. Only your health care provider has the knowledge and training to provide  advice that is right for you.  Copyright   Copyright © 2021 [employer] and its affiliates and/or licensors. All rights reserved.  Patient Education       [place] Diet   About this topic   A bland diet is made up of foods which are soft, not spicy, cooked, low in fat, and low in fiber. These foods are not likely to upset the GI tract.  What will the results be?   This diet will not make your stomach upset. This diet will help you get nutrients while you do not feel well.  What changes to diet are needed?   Eat small meals more often during the day.  Avoid large meals.  Stay away from spicy or seasoned foods or other foods that should be avoided.  Stay away from fatty foods, like fried foods and high-fat dairy products.  Eat slowly and chew your food carefully.  Drink fluids slowly.  Do not eat for at least 2 hours before going to bed.  Who should use this diet?   People with ulcers, heartburn, upset stomach, gas, loose stools, or throwing up should eat a bland diet.  What foods are good to eat?   Low-fat milk and other dairy products  Lactose-free products, like soy milk  Cooked, canned, or frozen vegetables  Fruit and vegetable juices without pulp. Dilute fruit juices with water if you have a problem with loose stools.  Cooked or canned fruit with no skin or seeds, like applesauce  Ripe bananas and melons  Breads, crackers, and pasta made with white flour  Hot cereals like cream of rice or cream of wheat  Lean, tender meats that are steamed, baked, or grilled with no added fat  Creamy peanut butter  Eggs  Tofu  Soup and broth  Drinks without caffeine     What foods should be limited or avoided?   Avoid any food or drinks that make your symptoms worse  Full-fat dairy foods, like whole milk  Raw vegetables  Strong cheese, like michelle cheese  Vegetables that can cause gas like broccoli, cabbage, and [place] sprouts  Any fruits or vegetables that cause heartburn symptoms like tomatoes and citrus fruits  Fresh fruits  (besides ripe bananas and melons), dried fruits, or fruits canned in heavy syrup  Whole grain or bran cereals, bread, crackers, or pasta  Fried pastries, such as donuts  Pickles, sauerkraut, and similar foods  Spicy foods and seasonings  Pepper  Foods with a lot of sugar or honey in them  Seeds and nuts  Higher fat cuts of meat, poultry with the skin, hotdogs, salami, and sausage  Meat or fish that has been seasoned or cured, like sardines and spear  Fried foods  Chocolate  Beer, wine, and mixed drinks (alcohol)  Peppermint and spearmint flavored foods and drinks  Drinks with caffeine  When do I need to call the doctor?   You are not feeling better in 2 to 3 days or you are feeling worse  If you have questions about your diet  Signs of fluid loss. These include dark-colored urine or no urine for more than 8 hours, dry mouth, cracked lips, sunken eyes, lack of energy, feeling faint, or passing out.  Last Reviewed Date   2021-03-12  Consumer Information Use and Disclaimer   This information is not specific medical advice and does not replace information you receive from your health care provider. This is only a brief summary of general information. It does NOT include all information about conditions, illnesses, injuries, tests, procedures, treatments, therapies, discharge instructions or life-style choices that may apply to you. You must talk with your health care provider for complete information about your health and treatment options. This information should not be used to decide whether or not to accept your health care providers advice, instructions or recommendations. Only your health care provider has the knowledge and training to provide advice that is right for you.  Copyright   Copyright © 2021 [employer] and its affiliates and/or licensors. All rights reserved.  ·   ·   · Follow up with your primary care in 2-5 days if symptoms have not improved, or you may return here.  · If you were referred to a  specialist, please follow up with that specialty.  · If you were prescribed antibiotics, please take them to completion.  · If you were prescribed a narcotic or any medication with sedative effects, do not drive or operate heavy equipment or machinery while taking these medications.  · You must understand that you have received treatment at an Urgent Care facility only, and that you may be released before all of your medical problems are known or treated. Urgent Care facilities are not equipped to handle life threatening emergencies. It is recommended that you go to an Emergency Department for further evaluation of worsening or concerning symptoms, or possibly life threatening conditions as discussed.                                        If you  smoke, please stop smoking

## 2022-01-05 NOTE — PROGRESS NOTES
Subjective:       Patient ID: Sharyn Hale is a 26 y.o. female.    Vitals:  tympanic temperature is 98.9 °F (37.2 °C). Her blood pressure is 107/62 and her pulse is 100. Her oxygen saturation is 98%.     Chief Complaint: Nausea    Flu exposure a month ago otherwise no known illness exposure.     Nausea  This is a new problem. The current episode started today. The problem occurs intermittently. The problem has been waxing and waning. Associated symptoms include a change in bowel habit, nausea and vomiting. Pertinent negatives include no abdominal pain, arthralgias, chest pain, chills, congestion, coughing, diaphoresis, fatigue, fever, headaches, joint swelling, myalgias, neck pain, rash, sore throat, urinary symptoms or weakness. Associated symptoms comments: diarrhea. The symptoms are aggravated by drinking and eating. She has tried nothing for the symptoms.       Constitution: Negative for chills, sweating, fatigue, fever and generalized weakness.   HENT: Negative for congestion and sore throat.    Neck: Negative for neck pain, neck stiffness and painful lymph nodes.   Cardiovascular: Negative for chest pain and sob on exertion.   Respiratory: Negative for chest tightness, cough, shortness of breath, wheezing and asthma.    Gastrointestinal: Positive for nausea, vomiting and diarrhea. Negative for abdominal pain, constipation, bright red blood in stool, dark colored stools, rectal bleeding and rectal pain.   Genitourinary: Negative for dysuria, frequency, urgency, urine decreased, flank pain, hematuria and missed menses.   Musculoskeletal: Negative for joint pain, joint swelling and muscle ache.   Skin: Negative for rash.   Allergic/Immunologic: Negative for asthma.   Neurological: Negative for headaches.   Hematologic/Lymphatic: Negative for swollen lymph nodes and easy bruising/bleeding. Does not bruise/bleed easily.       Objective:      Physical Exam   Constitutional: She is oriented to person, place, and  time. She appears well-developed and well-nourished.  Non-toxic appearance. She does not appear ill. No distress. normal  HENT:   Head: Normocephalic and atraumatic.   Ears:   Right Ear: External ear normal.   Left Ear: External ear normal.   Nose: Nose normal. No congestion.   Mouth/Throat: Mucous membranes are normal. Mucous membranes are moist. No oropharyngeal exudate. Oropharynx is clear.   Eyes: Conjunctivae and lids are normal. No scleral icterus.   Neck: Trachea normal. Neck supple. No neck rigidity present.   Cardiovascular: Normal rate, regular rhythm, normal heart sounds and normal pulses.   No murmur heard.  Pulmonary/Chest: Effort normal and breath sounds normal. No stridor. No respiratory distress. She has no wheezes. She has no rales.    Comments: talkative with no sob/guidry, no notable cough    Abdominal: Normal appearance and bowel sounds are normal. She exhibits no shifting dullness, no distension, no fluid wave, no abdominal bruit, no pulsatile midline mass and no mass. Soft. There is no splenomegaly or hepatomegaly. No signs of injury. There is no abdominal tenderness. There is no rebound, no guarding, no tenderness at McBurney's point, negative Sweeney's sign, no left CVA tenderness, negative Rovsing's sign, negative psoas sign, no right CVA tenderness and negative obturator sign. No hernia.     Musculoskeletal: Normal range of motion.         General: No tenderness or edema. Normal range of motion.   Lymphadenopathy:     She has no cervical adenopathy.   Neurological: no focal deficit. She is alert and oriented to person, place, and time. She has normal strength. She displays no weakness.   Skin: Skin is warm, dry, intact, not diaphoretic and not pale. Capillary refill takes less than 2 seconds. jaundice  Psychiatric: She has a normal mood and affect. Her speech is normal and behavior is normal. Mood, judgment and thought content normal. Cognition and memory  Nursing note and vitals reviewed.         Office Visit on 01/05/2022   Component Date Value Ref Range Status    POC Molecular Influenza A Ag 01/05/2022 Negative  Negative, Not Reported Final    POC Molecular Influenza B Ag 01/05/2022 Negative  Negative, Not Reported Final     Acceptable 01/05/2022 Yes   Final       Assessment:       1. Nausea vomiting and diarrhea    2. Vomiting, intractability of vomiting not specified, presence of nausea not specified, unspecified vomiting type    3. Acute viral syndrome          Plan:         Nausea vomiting and diarrhea  -     promethazine (PHENERGAN) 25 MG suppository; Place 1 suppository (25 mg total) rectally every 8 (eight) hours as needed for Nausea.  Dispense: 8 suppository; Refill: 0  -     promethazine (PHENERGAN) 25 MG tablet; Take 1 tablet (25 mg total) by mouth every 8 (eight) hours as needed for Nausea.  Dispense: 8 tablet; Refill: 0  -     diphenoxylate-atropine 2.5-0.025 mg (LOMOTIL) 2.5-0.025 mg per tablet; Take 1 tablet by mouth 4 (four) times daily as needed for Diarrhea.  Dispense: 8 tablet; Refill: 0    Vomiting, intractability of vomiting not specified, presence of nausea not specified, unspecified vomiting type  -     POCT Influenza A/B MOLECULAR    Acute viral syndrome  -     promethazine (PHENERGAN) 25 MG suppository; Place 1 suppository (25 mg total) rectally every 8 (eight) hours as needed for Nausea.  Dispense: 8 suppository; Refill: 0           Medical Decision Making:   Clinical Tests:   Lab Tests: Ordered and Reviewed       <> Summary of Lab: Neg flu reviewed with pt  Urgent Care Management:  Alert, nontoxic and in NAD. Afebrile. Pt with acute viral syndrome with n/v/d. Abdomen nonacute exam, no evidence dehydration. Symptomatic management. Educated on s/s to return to clinic, seek emergency care. Verbalizes understanding and agreement with treatment plan.              Patient Instructions   ·   Patient Education       Viral Gastroenteritis Discharge Instructions, Adult    About this topic   The stomach flu is also known as viral gastroenteritis. It is an infection caused by a virus. You may feel sick to your stomach or throw up. The medical terms for this are nausea and vomiting. You may also have loose stools, belly pain, or cramping. You may not be hungry. Some people have a fever or muscle aches. Viral gastroenteritis is easy to spread from person to person.           What care is needed at home?   Ask your doctor what you need to do when you go home. Make sure you ask questions if you do not understand what the doctor says. This way you will know what you need to do.  Drink small amounts of fluid every 15 to 30 minutes. Good fluids to drink are water, broth, sports drinks, and oral electrolyte solutions. It is also important to eat if you are able to keep food down.  Avoid beer, wine, and mixed drinks.  Check your blood sugar more often if you have high blood sugar.  If you are throwing up, try to drink if you can until you are able to eat small amounts of food.  If you cannot keep fluids down, suck on ice chips.  If you have loose stools, try to drink extra fluids to replace the water your body has lost.  If you are breastfeeding, keep feeding your baby as you normally would.  Avoid sharing your food and drinks.  Stay away from others until the throwing up or loose stools have stopped.  Follow good hygiene practices. Wash your hands often with soap and water for at least 20 seconds. Alcohol-based hand sanitizers also work to kill the virus. This is very important:  After using the bathroom  Before eating  Before cooking  What follow-up care is needed?   Your doctor may ask you to make visits to the office to check on your progress. Be sure to keep these visits.  What drugs may be needed?   Your doctor may not need to order drugs. Be sure to ask your doctor before you take any over-the-counter (OTC) or other drugs and treatments.  If your signs are very bad or last for more  than a few days, the doctor may order IV fluids or drugs to:  Fight an infection  Stop loose stools  Lower fever  Stop throwing up  Do not take antibiotics unless your doctor orders them.  Will physical activity be limited?   Your physical activity will not be limited. Make sure you get lots of rest. You may not be able to travel or go to work until the loose stools and throwing up have stopped for 24 hours.  What changes to diet are needed?   Take small sips of fluids often. Eat foods that have high water content like broth, Jello, and popsicles.  Avoid liquids such as soda, ginger ale, tea, fruit juice, and drinks with caffeine. These can make fluid loss worse. Ask your doctor what foods and drinks are safe for you.  You can eat a variety of food as your appetite gets better. Watch to see if some foods seem to make diarrhea or other symptoms worse, such as dairy products.  Avoid fatty and sugary foods such as cakes, chocolates, ice cream, and take out foods.  What problems could happen?   Too much fluid loss  What can be done to prevent this health problem?   Avoid close contact with people who have this illness.  Clean things and surfaces in your home like door handles and phones. Use bleach to clean the area. This can help lower the spread of infection.  Do not share personal things like toothbrushes, towels, and drinking glasses.  Take extra care when traveling. Drink bottled water only and do not eat raw foods.  Consider being vaccinated against certain viral infections. Ask your doctor about the shots that you need.  When do I need to call the doctor?   You feel very weak, like you cant stand up, and your skin is cool, clammy, or looks blue or gray.  You have severe abdominal pain.  You have chest pain or trouble breathing.  You have signs of severe fluid loss, such as:  No urine for more than 8 hours.  You feel very lightheaded or like you are going to pass out.  You feel weak like you are going to  fall.  You are not able to keep any fluids down.  You develop early signs of fluid loss again, such as:  Your urine is very dark colored.  Your mouth is dry.  You have muscle cramps.  You have a lack of energy.  You feel light-headed when you get up.  Teach Back: Helping You Understand   The Teach Back Method helps you understand the information we are giving you. The idea is simple. After talking with the staff, tell them in your own words what you were just told. This helps to make sure the staff has covered each thing clearly. It also helps to explain things that may have been a bit confusing. Before going home, make sure you are able to do these:  I can tell you about my condition.  I can tell you how often I should try to drink fluids and good kinds of fluids to drink.  I can tell you what I will do if I have trouble keeping fluids down.  Where can I learn more?   American Academy of Family Physicians  https://familydoctor.org/condition/stomach-virus-gastroenteritis/   National Digestive Diseases Information Clearinghouse  http://digestive.niddk.nih.gov/ddiseases/pubs/viralgastroenteritis/   Last Reviewed Date   2021-06-09  Consumer Information Use and Disclaimer   This information is not specific medical advice and does not replace information you receive from your health care provider. This is only a brief summary of general information. It does NOT include all information about conditions, illnesses, injuries, tests, procedures, treatments, therapies, discharge instructions or life-style choices that may apply to you. You must talk with your health care provider for complete information about your health and treatment options. This information should not be used to decide whether or not to accept your health care providers advice, instructions or recommendations. Only your health care provider has the knowledge and training to provide advice that is right for you.  Copyright   Copyright © 2021 UpToDate,  Inc. and its affiliates and/or licensors. All rights reserved.  Patient Education       [place] Diet   About this topic   A bland diet is made up of foods which are soft, not spicy, cooked, low in fat, and low in fiber. These foods are not likely to upset the GI tract.  What will the results be?   This diet will not make your stomach upset. This diet will help you get nutrients while you do not feel well.  What changes to diet are needed?   Eat small meals more often during the day.  Avoid large meals.  Stay away from spicy or seasoned foods or other foods that should be avoided.  Stay away from fatty foods, like fried foods and high-fat dairy products.  Eat slowly and chew your food carefully.  Drink fluids slowly.  Do not eat for at least 2 hours before going to bed.  Who should use this diet?   People with ulcers, heartburn, upset stomach, gas, loose stools, or throwing up should eat a bland diet.  What foods are good to eat?   Low-fat milk and other dairy products  Lactose-free products, like soy milk  Cooked, canned, or frozen vegetables  Fruit and vegetable juices without pulp. Dilute fruit juices with water if you have a problem with loose stools.  Cooked or canned fruit with no skin or seeds, like applesauce  Ripe bananas and melons  Breads, crackers, and pasta made with white flour  Hot cereals like cream of rice or cream of wheat  Lean, tender meats that are steamed, baked, or grilled with no added fat  Creamy peanut butter  Eggs  Tofu  Soup and broth  Drinks without caffeine     What foods should be limited or avoided?   Avoid any food or drinks that make your symptoms worse  Full-fat dairy foods, like whole milk  Raw vegetables  Strong cheese, like michelle cheese  Vegetables that can cause gas like broccoli, cabbage, and [place] sprouts  Any fruits or vegetables that cause heartburn symptoms like tomatoes and citrus fruits  Fresh fruits (besides ripe bananas and melons), dried fruits, or fruits canned in  heavy syrup  Whole grain or bran cereals, bread, crackers, or pasta  Fried pastries, such as donuts  Pickles, sauerkraut, and similar foods  Spicy foods and seasonings  Pepper  Foods with a lot of sugar or honey in them  Seeds and nuts  Higher fat cuts of meat, poultry with the skin, hotdogs, salami, and sausage  Meat or fish that has been seasoned or cured, like sardines and spear  Fried foods  Chocolate  Beer, wine, and mixed drinks (alcohol)  Peppermint and spearmint flavored foods and drinks  Drinks with caffeine  When do I need to call the doctor?   You are not feeling better in 2 to 3 days or you are feeling worse  If you have questions about your diet  Signs of fluid loss. These include dark-colored urine or no urine for more than 8 hours, dry mouth, cracked lips, sunken eyes, lack of energy, feeling faint, or passing out.  Last Reviewed Date   2021-03-12  Consumer Information Use and Disclaimer   This information is not specific medical advice and does not replace information you receive from your health care provider. This is only a brief summary of general information. It does NOT include all information about conditions, illnesses, injuries, tests, procedures, treatments, therapies, discharge instructions or life-style choices that may apply to you. You must talk with your health care provider for complete information about your health and treatment options. This information should not be used to decide whether or not to accept your health care providers advice, instructions or recommendations. Only your health care provider has the knowledge and training to provide advice that is right for you.  Copyright   Copyright © 2021 UpToDate, Inc. and its affiliates and/or licensors. All rights reserved.  ·   ·   · Follow up with your primary care in 2-5 days if symptoms have not improved, or you may return here.  · If you were referred to a specialist, please follow up with that specialty.  · If you were  prescribed antibiotics, please take them to completion.  · If you were prescribed a narcotic or any medication with sedative effects, do not drive or operate heavy equipment or machinery while taking these medications.  · You must understand that you have received treatment at an Urgent Care facility only, and that you may be released before all of your medical problems are known or treated. Urgent Care facilities are not equipped to handle life threatening emergencies. It is recommended that you go to an Emergency Department for further evaluation of worsening or concerning symptoms, or possibly life threatening conditions as discussed.                                        If you  smoke, please stop smoking

## 2022-04-05 ENCOUNTER — OFFICE VISIT (OUTPATIENT)
Dept: GASTROENTEROLOGY | Facility: CLINIC | Age: 27
End: 2022-04-05
Payer: MEDICAID

## 2022-04-05 VITALS
SYSTOLIC BLOOD PRESSURE: 100 MMHG | WEIGHT: 96.88 LBS | HEIGHT: 63 IN | HEART RATE: 102 BPM | BODY MASS INDEX: 17.16 KG/M2 | DIASTOLIC BLOOD PRESSURE: 67 MMHG

## 2022-04-05 DIAGNOSIS — R11.2 INTRACTABLE VOMITING WITH NAUSEA, UNSPECIFIED VOMITING TYPE: Primary | ICD-10-CM

## 2022-04-05 PROCEDURE — 99999 PR PBB SHADOW E&M-EST. PATIENT-LVL IV: ICD-10-PCS | Mod: PBBFAC,,, | Performed by: NURSE PRACTITIONER

## 2022-04-05 PROCEDURE — 3078F DIAST BP <80 MM HG: CPT | Mod: CPTII,,, | Performed by: NURSE PRACTITIONER

## 2022-04-05 PROCEDURE — 3074F SYST BP LT 130 MM HG: CPT | Mod: CPTII,,, | Performed by: NURSE PRACTITIONER

## 2022-04-05 PROCEDURE — 1159F PR MEDICATION LIST DOCUMENTED IN MEDICAL RECORD: ICD-10-PCS | Mod: CPTII,,, | Performed by: NURSE PRACTITIONER

## 2022-04-05 PROCEDURE — 99999 PR PBB SHADOW E&M-EST. PATIENT-LVL IV: CPT | Mod: PBBFAC,,, | Performed by: NURSE PRACTITIONER

## 2022-04-05 PROCEDURE — 1159F MED LIST DOCD IN RCRD: CPT | Mod: CPTII,,, | Performed by: NURSE PRACTITIONER

## 2022-04-05 PROCEDURE — 1160F RVW MEDS BY RX/DR IN RCRD: CPT | Mod: CPTII,,, | Performed by: NURSE PRACTITIONER

## 2022-04-05 PROCEDURE — 3074F PR MOST RECENT SYSTOLIC BLOOD PRESSURE < 130 MM HG: ICD-10-PCS | Mod: CPTII,,, | Performed by: NURSE PRACTITIONER

## 2022-04-05 PROCEDURE — 99203 OFFICE O/P NEW LOW 30 MIN: CPT | Mod: S$PBB,,, | Performed by: NURSE PRACTITIONER

## 2022-04-05 PROCEDURE — 3008F PR BODY MASS INDEX (BMI) DOCUMENTED: ICD-10-PCS | Mod: CPTII,,, | Performed by: NURSE PRACTITIONER

## 2022-04-05 PROCEDURE — 3008F BODY MASS INDEX DOCD: CPT | Mod: CPTII,,, | Performed by: NURSE PRACTITIONER

## 2022-04-05 PROCEDURE — 3078F PR MOST RECENT DIASTOLIC BLOOD PRESSURE < 80 MM HG: ICD-10-PCS | Mod: CPTII,,, | Performed by: NURSE PRACTITIONER

## 2022-04-05 PROCEDURE — 99214 OFFICE O/P EST MOD 30 MIN: CPT | Mod: PBBFAC,PO | Performed by: NURSE PRACTITIONER

## 2022-04-05 PROCEDURE — 1160F PR REVIEW ALL MEDS BY PRESCRIBER/CLIN PHARMACIST DOCUMENTED: ICD-10-PCS | Mod: CPTII,,, | Performed by: NURSE PRACTITIONER

## 2022-04-05 PROCEDURE — 99203 PR OFFICE/OUTPT VISIT, NEW, LEVL III, 30-44 MIN: ICD-10-PCS | Mod: S$PBB,,, | Performed by: NURSE PRACTITIONER

## 2022-04-05 RX ORDER — AMOXICILLIN AND CLAVULANATE POTASSIUM 875; 125 MG/1; MG/1
1 TABLET, FILM COATED ORAL 2 TIMES DAILY
COMMUNITY
Start: 2022-03-28 | End: 2022-04-28 | Stop reason: ALTCHOICE

## 2022-04-05 RX ORDER — OMEPRAZOLE 40 MG/1
40 CAPSULE, DELAYED RELEASE ORAL DAILY
Qty: 30 CAPSULE | Refills: 2 | Status: SHIPPED | OUTPATIENT
Start: 2022-04-05 | End: 2022-07-05

## 2022-04-05 NOTE — LETTER
April 5, 2022      [facility]  [address], [place]   [place] [address]  Phone: [phone]  Fax: [phone]       Patient: Sharyn Hale   YOB: 1995  Date of Visit: 04/05/2022    To Whom It May Concern:    Angela Hale  was at [facility] on 04/05/2022. The patient may return to work/school on 4/6/2022 with no restrictions. If you have any questions or concerns, or if I can be of further assistance, please do not hesitate to contact me.    Sincerely,    MARLEN Bliss

## 2022-04-05 NOTE — PROGRESS NOTES
Subjective:       Patient ID: Sharyn Hale is a 26 y.o. female.    Chief Complaint: Nausea and Emesis    25 y/o female referred by PCP for nausea and vomiting. Patient reports sporadic episodes of n/v without abdominal pain for 6 months. No exacerbating or alleviating factors.    Nausea  This is a recurrent problem. The current episode started more than 1 month ago. The problem occurs intermittently. The problem has been waxing and waning. Associated symptoms include nausea and vomiting. Pertinent negatives include no abdominal pain, anorexia, change in bowel habit, chest pain, chills, coughing, fatigue, fever, headaches, swollen glands or urinary symptoms. Nothing aggravates the symptoms.   Emesis   This is a recurrent problem. The current episode started more than 1 month ago. The problem occurs intermittently. The problem has been waxing and waning. The emesis has an appearance of bile. There has been no fever. Pertinent negatives include no abdominal pain, chest pain, chills, coughing, fever or headaches. She has tried anti-emetic for the symptoms. The treatment provided mild relief.       History reviewed. No pertinent past medical history.    Past Surgical History:   Procedure Laterality Date     SECTION         Family History   Problem Relation Age of Onset    No Known Problems Mother     No Known Problems Father        Social History     Socioeconomic History    Marital status: Single   Tobacco Use    Smoking status: Never Smoker    Smokeless tobacco: Never Used   Substance and Sexual Activity    Alcohol use: No    Drug use: No       Review of Systems   Constitutional: Negative for chills, fatigue, fever and unexpected weight change.   HENT: Negative for trouble swallowing.    Respiratory: Negative for cough.    Cardiovascular: Negative for chest pain.   Gastrointestinal: Positive for nausea and vomiting. Negative for abdominal pain, anorexia and change in bowel habit.   Musculoskeletal:  "Negative for back pain.   Neurological: Negative for headaches.   Hematological: Negative for adenopathy. Does not bruise/bleed easily.   Psychiatric/Behavioral: Negative for dysphoric mood.         Objective:     Vitals:    04/05/22 0839   BP: 100/67   Pulse: 102   Weight: 44 kg (96 lb 14.4 oz)   Height: 5' 3" (1.6 m)          Physical Exam  Constitutional:       General: She is not in acute distress.     Appearance: Normal appearance. She is not ill-appearing.   HENT:      Head: Normocephalic.   Eyes:      Conjunctiva/sclera: Conjunctivae normal.      Pupils: Pupils are equal, round, and reactive to light.   Pulmonary:      Effort: Pulmonary effort is normal. No respiratory distress.   Musculoskeletal:         General: Normal range of motion.      Cervical back: Normal range of motion.   Skin:     General: Skin is warm and dry.   Neurological:      Mental Status: She is alert and oriented to person, place, and time.   Psychiatric:         Mood and Affect: Mood normal.         Behavior: Behavior normal.               Assessment:         ICD-10-CM ICD-9-CM   1. Intractable vomiting with nausea, unspecified vomiting type  R11.2 536.2       Plan:       Intractable vomiting with nausea, unspecified vomiting type  -     omeprazole (PRILOSEC) 40 MG capsule; Take 1 capsule (40 mg total) by mouth once daily.  Dispense: 30 capsule; Refill: 2  -     Case Request Endoscopy: EGD (ESOPHAGOGASTRODUODENOSCOPY)      Follow up if symptoms worsen or fail to improve.     Patient's Medications   New Prescriptions    OMEPRAZOLE (PRILOSEC) 40 MG CAPSULE    Take 1 capsule (40 mg total) by mouth once daily.   Previous Medications    AMOXICILLIN-CLAVULANATE 875-125MG (AUGMENTIN) 875-125 MG PER TABLET    Take 1 tablet by mouth 2 (two) times daily.    FLUTICASONE PROPIONATE (FLONASE) 50 MCG/ACTUATION NASAL SPRAY    1 spray (50 mcg total) by Each Nostril route once daily.    NAPROXEN (NAPROSYN) 375 MG TABLET    Take 1 tablet (375 mg total) by " mouth 2 (two) times daily.    PROMETHAZINE (PHENERGAN) 25 MG SUPPOSITORY    Place 1 suppository (25 mg total) rectally every 8 (eight) hours as needed for Nausea.    PROMETHAZINE (PHENERGAN) 25 MG TABLET    Take 1 tablet (25 mg total) by mouth every 8 (eight) hours as needed for Nausea.    VALACYCLOVIR (VALTREX) 1000 MG TABLET    Take 1,000 mg by mouth once daily.   Modified Medications    No medications on file   Discontinued Medications    DEXCHLORPHENIRAMIN-PSEUDOEPHED (RESCON) 2-60 MG TAB    Take 1 tablet by mouth 2 (two) times daily as needed.    DIPHENOXYLATE-ATROPINE 2.5-0.025 MG (LOMOTIL) 2.5-0.025 MG PER TABLET    Take 1 tablet by mouth 4 (four) times daily as needed for Diarrhea.

## 2022-04-05 NOTE — PATIENT INSTRUCTIONS
EGD Prep Instructions    [facility] [address] [place]  [address]    You are scheduled for an EGD with Dr. Donaldson on 5/3/2022 at [facility].  You will enter through the [facility] entrance and check in at Same Day Surgery.    Nothing to eat or drink after midnight before the procedure.  You MAY brush your teeth.    You MAY take your blood pressure, heart, and seizure medication on the morning of the procedure, with a SIP of water.  Hold ALL other medications until after the procedure.    You must have someone with you to DRIVE YOU HOME since you will be receiving IV sedation for the procedure.    If you are on blood thinners THAT YOU HAVE BEEN INSTRUCTED TO HOLD BY YOUR DOCTOR FOR THIS PROCEDURE, then do NOT take this the morning of your EGD.  Do NOT stop these medications on your own, they must be approved to be held by your doctor.  Your EGD can NOT be done if you are on these medications.  Examples of blood thinners include: Coumadin, Aggrenox, Plavix, Pradaxa, Reapro, Pletal, Xarelto, Ticagrelor, Brilinta, Eliquis, and high dose aspirin (325 mg).  You do not have to stop baby aspirin 81 mg.    You will receive a call a few days before your EGD to tell you the time to arrive.  If you have not received a call by the day before your procedure, call the Pre-op Coordinator at [phone].

## 2022-07-25 ENCOUNTER — OFFICE VISIT (OUTPATIENT)
Dept: URGENT CARE | Facility: CLINIC | Age: 27
End: 2022-07-25
Payer: MEDICAID

## 2022-07-25 VITALS
HEIGHT: 63 IN | BODY MASS INDEX: 17.72 KG/M2 | WEIGHT: 100 LBS | TEMPERATURE: 97 F | OXYGEN SATURATION: 97 % | RESPIRATION RATE: 18 BRPM | HEART RATE: 104 BPM | DIASTOLIC BLOOD PRESSURE: 62 MMHG | SYSTOLIC BLOOD PRESSURE: 95 MMHG

## 2022-07-25 DIAGNOSIS — Z20.822 CONTACT WITH AND (SUSPECTED) EXPOSURE TO COVID-19: ICD-10-CM

## 2022-07-25 DIAGNOSIS — J02.9 SORE THROAT: Primary | ICD-10-CM

## 2022-07-25 LAB
CTP QC/QA: YES
SARS-COV-2 RDRP RESP QL NAA+PROBE: NEGATIVE

## 2022-07-25 PROCEDURE — 3074F PR MOST RECENT SYSTOLIC BLOOD PRESSURE < 130 MM HG: ICD-10-PCS | Mod: CPTII,S$GLB,, | Performed by: PHYSICIAN ASSISTANT

## 2022-07-25 PROCEDURE — 3008F PR BODY MASS INDEX (BMI) DOCUMENTED: ICD-10-PCS | Mod: CPTII,S$GLB,, | Performed by: PHYSICIAN ASSISTANT

## 2022-07-25 PROCEDURE — 1160F PR REVIEW ALL MEDS BY PRESCRIBER/CLIN PHARMACIST DOCUMENTED: ICD-10-PCS | Mod: CPTII,S$GLB,, | Performed by: PHYSICIAN ASSISTANT

## 2022-07-25 PROCEDURE — 99214 PR OFFICE/OUTPT VISIT, EST, LEVL IV, 30-39 MIN: ICD-10-PCS | Mod: S$GLB,,, | Performed by: PHYSICIAN ASSISTANT

## 2022-07-25 PROCEDURE — 3078F PR MOST RECENT DIASTOLIC BLOOD PRESSURE < 80 MM HG: ICD-10-PCS | Mod: CPTII,S$GLB,, | Performed by: PHYSICIAN ASSISTANT

## 2022-07-25 PROCEDURE — 3078F DIAST BP <80 MM HG: CPT | Mod: CPTII,S$GLB,, | Performed by: PHYSICIAN ASSISTANT

## 2022-07-25 PROCEDURE — U0002: ICD-10-PCS | Mod: QW,S$GLB,, | Performed by: PHYSICIAN ASSISTANT

## 2022-07-25 PROCEDURE — 3074F SYST BP LT 130 MM HG: CPT | Mod: CPTII,S$GLB,, | Performed by: PHYSICIAN ASSISTANT

## 2022-07-25 PROCEDURE — 1159F PR MEDICATION LIST DOCUMENTED IN MEDICAL RECORD: ICD-10-PCS | Mod: CPTII,S$GLB,, | Performed by: PHYSICIAN ASSISTANT

## 2022-07-25 PROCEDURE — 3008F BODY MASS INDEX DOCD: CPT | Mod: CPTII,S$GLB,, | Performed by: PHYSICIAN ASSISTANT

## 2022-07-25 PROCEDURE — U0002 COVID-19 LAB TEST NON-CDC: HCPCS | Mod: QW,S$GLB,, | Performed by: PHYSICIAN ASSISTANT

## 2022-07-25 PROCEDURE — 1159F MED LIST DOCD IN RCRD: CPT | Mod: CPTII,S$GLB,, | Performed by: PHYSICIAN ASSISTANT

## 2022-07-25 PROCEDURE — 99214 OFFICE O/P EST MOD 30 MIN: CPT | Mod: S$GLB,,, | Performed by: PHYSICIAN ASSISTANT

## 2022-07-25 PROCEDURE — 1160F RVW MEDS BY RX/DR IN RCRD: CPT | Mod: CPTII,S$GLB,, | Performed by: PHYSICIAN ASSISTANT

## 2022-07-25 RX ORDER — IBUPROFEN 600 MG/1
600 TABLET ORAL EVERY 6 HOURS PRN
Qty: 30 TABLET | Refills: 0 | Status: SHIPPED | OUTPATIENT
Start: 2022-07-25

## 2022-07-25 RX ORDER — METOCLOPRAMIDE 10 MG/1
10 TABLET ORAL EVERY 6 HOURS PRN
COMMUNITY
Start: 2022-05-25

## 2022-07-25 RX ORDER — GUAIFENESIN AND DEXTROMETHORPHAN HYDROBROMIDE 1200; 60 MG/1; MG/1
1 TABLET, EXTENDED RELEASE ORAL 2 TIMES DAILY
Qty: 20 TABLET | Refills: 0 | Status: SHIPPED | OUTPATIENT
Start: 2022-07-25 | End: 2022-08-04

## 2022-07-25 RX ORDER — IPRATROPIUM BROMIDE 42 UG/1
2 SPRAY, METERED NASAL 2 TIMES DAILY
Qty: 15 ML | Refills: 0 | Status: SHIPPED | OUTPATIENT
Start: 2022-07-25

## 2022-07-25 RX ORDER — CETIRIZINE HYDROCHLORIDE 10 MG/1
10 TABLET ORAL DAILY
Qty: 30 TABLET | Refills: 0 | Status: SHIPPED | OUTPATIENT
Start: 2022-07-25 | End: 2023-07-25

## 2022-07-25 NOTE — LETTER
[address] [facility] ? [place], [address] ? Phone [phone] ? Fax [phone]           Return to Work/School    Patient: Sharyn Hale  YOB: 1995   Date: 07/25/2022      To Whom It May Concern:     Sharyn Hale was in contact with/seen in my office on 07/25/2022. COVID-19 is present in our communities across the state. Not all patients are eligible or appropriate to be tested. In this situation, your employee meets the following criteria:     Sharyn Hale has met the criteria for COVID-19 testing and has a NEGATIVE result. The employee can return to work once they are asymptomatic for 24 hours without the use of fever reducing medications (Tylenol, Motrin, etc). Recommend repeat testing tomorrow, if negative may return as long as symptoms have improved, if positive, recommend quarantine until 07/29/22.     If you have any questions or concerns, or if I can be of further assistance, please do not hesitate to contact me.     Sincerely,    Lo Sood PA-C

## 2023-07-13 ENCOUNTER — HOSPITAL ENCOUNTER (EMERGENCY)
Facility: HOSPITAL | Age: 28
Discharge: HOME | End: 2023-07-13
Attending: SURGERY
Payer: MEDICAID

## 2023-07-13 VITALS
HEART RATE: 100 BPM | SYSTOLIC BLOOD PRESSURE: 111 MMHG | TEMPERATURE: 98 F | RESPIRATION RATE: 16 BRPM | WEIGHT: 108.38 LBS | DIASTOLIC BLOOD PRESSURE: 63 MMHG | OXYGEN SATURATION: 99 % | BODY MASS INDEX: 19.19 KG/M2

## 2023-07-13 DIAGNOSIS — R11.2 NAUSEA AND VOMITING, UNSPECIFIED VOMITING TYPE: Primary | ICD-10-CM

## 2023-07-13 LAB
ALBUMIN SERPL BCP-MCNC: 4.5 G/DL (ref 3.5–5.2)
ALP SERPL-CCNC: 66 U/L (ref 55–135)
ALT SERPL W/O P-5'-P-CCNC: 18 U/L (ref 10–44)
AMPHET+METHAMPHET UR QL: NEGATIVE
ANION GAP SERPL CALC-SCNC: 14 MMOL/L (ref 8–16)
AST SERPL-CCNC: 19 U/L (ref 10–40)
B-HCG UR QL: NEGATIVE
BARBITURATES UR QL SCN>200 NG/ML: NEGATIVE
BASOPHILS # BLD AUTO: 0.02 K/UL (ref 0–0.2)
BASOPHILS NFR BLD: 0.2 % (ref 0–1.9)
BENZODIAZ UR QL SCN>200 NG/ML: NEGATIVE
BILIRUB SERPL-MCNC: 0.6 MG/DL (ref 0.1–1)
BILIRUB UR QL STRIP: NEGATIVE
BUN SERPL-MCNC: 10 MG/DL (ref 6–20)
BZE UR QL SCN: NEGATIVE
CALCIUM SERPL-MCNC: 10 MG/DL (ref 8.7–10.5)
CANNABINOIDS UR QL SCN: NEGATIVE
CHLORIDE SERPL-SCNC: 105 MMOL/L (ref 95–110)
CLARITY UR: CLEAR
CO2 SERPL-SCNC: 22 MMOL/L (ref 23–29)
COLOR UR: YELLOW
CREAT SERPL-MCNC: 0.8 MG/DL (ref 0.5–1.4)
CREAT UR-MCNC: 282.3 MG/DL (ref 15–325)
DIFFERENTIAL METHOD: ABNORMAL
EOSINOPHIL # BLD AUTO: 0.1 K/UL (ref 0–0.5)
EOSINOPHIL NFR BLD: 0.7 % (ref 0–8)
ERYTHROCYTE [DISTWIDTH] IN BLOOD BY AUTOMATED COUNT: 12.9 % (ref 11.5–14.5)
EST. GFR  (NO RACE VARIABLE): >60 ML/MIN/1.73 M^2
GLUCOSE SERPL-MCNC: 81 MG/DL (ref 70–110)
GLUCOSE UR QL STRIP: NEGATIVE
HCT VFR BLD AUTO: 43.7 % (ref 37–48.5)
HGB BLD-MCNC: 14.6 G/DL (ref 12–16)
HGB UR QL STRIP: ABNORMAL
IMM GRANULOCYTES # BLD AUTO: 0.02 K/UL (ref 0–0.04)
IMM GRANULOCYTES NFR BLD AUTO: 0.2 % (ref 0–0.5)
KETONES UR QL STRIP: ABNORMAL
LEUKOCYTE ESTERASE UR QL STRIP: NEGATIVE
LIPASE SERPL-CCNC: 14 U/L (ref 4–60)
LYMPHOCYTES # BLD AUTO: 1.9 K/UL (ref 1–4.8)
LYMPHOCYTES NFR BLD: 18.1 % (ref 18–48)
MCH RBC QN AUTO: 31.9 PG (ref 27–31)
MCHC RBC AUTO-ENTMCNC: 33.4 G/DL (ref 32–36)
MCV RBC AUTO: 95 FL (ref 82–98)
METHADONE UR QL SCN>300 NG/ML: NEGATIVE
MONOCYTES # BLD AUTO: 0.7 K/UL (ref 0.3–1)
MONOCYTES NFR BLD: 6.9 % (ref 4–15)
NEUTROPHILS # BLD AUTO: 7.7 K/UL (ref 1.8–7.7)
NEUTROPHILS NFR BLD: 73.9 % (ref 38–73)
NITRITE UR QL STRIP: NEGATIVE
NRBC BLD-RTO: 0 /100 WBC
OPIATES UR QL SCN: NEGATIVE
PCP UR QL SCN>25 NG/ML: NEGATIVE
PH UR STRIP: 6 [PH] (ref 5–8)
PLATELET # BLD AUTO: 323 K/UL (ref 150–450)
PMV BLD AUTO: 10.8 FL (ref 9.2–12.9)
POTASSIUM SERPL-SCNC: 3.5 MMOL/L (ref 3.5–5.1)
PROT SERPL-MCNC: 7.8 G/DL (ref 6–8.4)
PROT UR QL STRIP: NEGATIVE
RBC # BLD AUTO: 4.58 M/UL (ref 4–5.4)
SODIUM SERPL-SCNC: 141 MMOL/L (ref 136–145)
SP GR UR STRIP: >=1.03 (ref 1–1.03)
TOXICOLOGY INFORMATION: NORMAL
URN SPEC COLLECT METH UR: ABNORMAL
UROBILINOGEN UR STRIP-ACNC: NEGATIVE EU/DL
WBC # BLD AUTO: 10.37 K/UL (ref 3.9–12.7)

## 2023-07-13 PROCEDURE — 80053 COMPREHEN METABOLIC PANEL: CPT | Performed by: SURGERY

## 2023-07-13 PROCEDURE — 25000003 PHARM REV CODE 250: Performed by: SURGERY

## 2023-07-13 PROCEDURE — 83690 ASSAY OF LIPASE: CPT | Performed by: SURGERY

## 2023-07-13 PROCEDURE — 63600175 PHARM REV CODE 636 W HCPCS: Performed by: SURGERY

## 2023-07-13 PROCEDURE — 81025 URINE PREGNANCY TEST: CPT | Performed by: SURGERY

## 2023-07-13 PROCEDURE — 99284 EMERGENCY DEPT VISIT MOD MDM: CPT | Mod: 25

## 2023-07-13 PROCEDURE — 96361 HYDRATE IV INFUSION ADD-ON: CPT

## 2023-07-13 PROCEDURE — 81003 URINALYSIS AUTO W/O SCOPE: CPT | Mod: 59 | Performed by: SURGERY

## 2023-07-13 PROCEDURE — 96365 THER/PROPH/DIAG IV INF INIT: CPT

## 2023-07-13 PROCEDURE — 36415 COLL VENOUS BLD VENIPUNCTURE: CPT | Performed by: SURGERY

## 2023-07-13 PROCEDURE — 80307 DRUG TEST PRSMV CHEM ANLYZR: CPT | Performed by: SURGERY

## 2023-07-13 PROCEDURE — 85025 COMPLETE CBC W/AUTO DIFF WBC: CPT | Performed by: SURGERY

## 2023-07-13 RX ORDER — ONDANSETRON 2 MG/ML
4 INJECTION INTRAMUSCULAR; INTRAVENOUS
Status: DISCONTINUED | OUTPATIENT
Start: 2023-07-13 | End: 2023-07-13

## 2023-07-13 RX ADMIN — SODIUM CHLORIDE 2000 ML: 9 INJECTION, SOLUTION INTRAVENOUS at 08:07

## 2023-07-13 RX ADMIN — PROMETHAZINE HYDROCHLORIDE 25 MG: 25 INJECTION INTRAMUSCULAR; INTRAVENOUS at 08:07

## 2023-07-14 NOTE — ED PROVIDER NOTES
Encounter Date: 2023       History     Chief Complaint   Patient presents with    Emesis     Patient to ER CC of vomiting and diarrhea that started at 03:30 this morning      Sharyn Hale is a 27 y.o. female that presents with chronic nausea vomiting issues  Patient has had longstanding issues with nausea vomiting, no diagnosis given yet  Patient had endoscopy in  with no definitive diagnosis given on ER interview  She began have nausea vomiting with no associated fever or abdominal pain  Denies any dysuria hematuria, denies any vaginal discharge on ER interview now  Patient had oral Phenergan at home in his requesting IVF & IV Phenergan tonight    Review of patient's allergies indicates:  No Known Allergies  No past medical history on file.  Past Surgical History:   Procedure Laterality Date     SECTION      ESOPHAGOGASTRODUODENOSCOPY N/A 5/3/2022    Procedure: EGD (ESOPHAGOGASTRODUODENOSCOPY);  Surgeon: Kimberley Donaldson MD;  [facility];  Service: Endoscopy;  Laterality: N/A;     Family History   Problem Relation Age of Onset    No Known Problems Mother     No Known Problems Father      Social History     Tobacco Use    Smoking status: Never    Smokeless tobacco: Never   Substance Use Topics    Alcohol use: No    Drug use: No     Review of Systems   Constitutional: Negative.    HENT: Negative.     Eyes: Negative.    Respiratory: Negative.     Cardiovascular: Negative.    Gastrointestinal:  Positive for nausea and vomiting.   Genitourinary:  Negative for dysuria, urgency and vaginal discharge.   Skin: Negative.    Neurological: Negative.    Psychiatric/Behavioral: Negative.     All other systems reviewed and are negative.    Physical Exam     Initial Vitals [23]   BP Pulse Resp Temp SpO2   103/77 (!) 111 18 97.7 °F (36.5 °C) 99 %      MAP       --         Physical Exam    Nursing note and vitals reviewed.  Constitutional: Vital signs are normal. She appears well-developed and  well-nourished. She is cooperative.   HENT:   Head: Normocephalic and atraumatic.   Right Ear: External ear normal.   Left Ear: External ear normal.   Nose: Nose normal.   Mouth/Throat: Oropharynx is clear and moist.   Eyes: Conjunctivae, EOM and lids are normal. Pupils are equal, round, and reactive to light.   Neck: Trachea normal and phonation normal. Neck supple. No JVD present.   Normal range of motion.   Full passive range of motion without pain.     Cardiovascular:  Normal rate, regular rhythm, S1 normal, S2 normal, normal heart sounds, intact distal pulses and normal pulses.           Pulmonary/Chest: Effort normal and breath sounds normal.   Abdominal: Abdomen is soft and flat. Bowel sounds are normal.   Musculoskeletal:         General: Normal range of motion.      Cervical back: Full passive range of motion without pain, normal range of motion and neck supple.     Neurological: She is alert and oriented to person, place, and time. She has normal strength.   Skin: Skin is warm, dry and intact. Capillary refill takes less than 2 seconds.       ED Course   Procedures  Labs Reviewed   CBC W/ AUTO DIFFERENTIAL - Abnormal; Notable for the following components:       Result Value    MCH 31.9 (*)     Gran % 73.9 (*)     All other components within normal limits   COMPREHENSIVE METABOLIC PANEL - Abnormal; Notable for the following components:    CO2 22 (*)     All other components within normal limits   URINALYSIS, REFLEX TO URINE CULTURE - Abnormal; Notable for the following components:    Specific Gravity, UA >=1.030 (*)     Ketones, UA Trace (*)     Occult Blood UA Trace (*)     All other components within normal limits    Narrative:     Specimen Source->Urine   LIPASE   PREGNANCY TEST, URINE RAPID    Narrative:     Specimen Source->Urine   DRUG SCREEN PANEL, URINE EMERGENCY    Narrative:     Specimen Source->Urine          Imaging Results    None          Medications   sodium chloride 0.9% bolus 2,000 mL 2,000  mL (2,000 mLs Intravenous New Bag 7/13/23 2042)   promethazine (PHENERGAN) 25 mg in dextrose 5 % (D5W) 50 mL IVPB (25 mg Intravenous New Bag 7/13/23 2042)                       Medical Decision Making  Nausea vomiting chronically, exacerbation this afternoon  Patient had endoscopy last year with no definitive diagnosis    Problems Addressed:  Nausea and vomiting, unspecified vomiting type: complicated acute illness or injury    Amount and/or Complexity of Data Reviewed  Labs: ordered. Decision-making details documented in ED Course.    ED Management & Risk of Complications, Morbidity, Mortality:  Chronic nausea vomiting issues with no abdominal pain on interview  Normal CBC, normal CMP, (-) urinalysis, (-) UPT in the ER today  IV fluids given, IV Phenergan given, patient feeling much much better  Will follow-up with gastroenterology as an outpatient on ER discharge  This patient does not need to be hospitalized on ER evaluation today  The patient's diagnosis is not limited by social determinants of health  Does not require surgery or procedure (major or minor), no risk factors  Patient counseled to return to the ER with any concerning symptoms          Clinical Impression:   Final diagnoses:  [R11.2] Nausea and vomiting, unspecified vomiting type (Primary)        ED Disposition Condition    Discharge Stable          ED Prescriptions    None       Follow-up Information       Follow up With Specialties Details Why Contact Info    Tory Gallagher MD Internal Medicine Schedule an appointment as soon as possible for a visit in 2 days  [address] [facility] [address]  [phone]               Tommie Jean MD  07/13/23 4685

## 2023-08-10 DIAGNOSIS — R11.2 INTRACTABLE VOMITING WITH NAUSEA: ICD-10-CM

## 2023-08-10 RX ORDER — OMEPRAZOLE 40 MG/1
CAPSULE, DELAYED RELEASE ORAL
Qty: 30 CAPSULE | Refills: 2 | Status: SHIPPED | OUTPATIENT
Start: 2023-08-10

## 2023-11-27 ENCOUNTER — OFFICE VISIT (OUTPATIENT)
Dept: URGENT CARE | Facility: CLINIC | Age: 28
End: 2023-11-27
Payer: MEDICAID

## 2024-04-22 ENCOUNTER — HOSPITAL ENCOUNTER (EMERGENCY)
Facility: HOSPITAL | Age: 29
Discharge: HOME | End: 2024-04-22
Attending: EMERGENCY MEDICINE
Payer: MEDICAID

## 2024-04-22 VITALS
TEMPERATURE: 99 F | SYSTOLIC BLOOD PRESSURE: 100 MMHG | HEART RATE: 88 BPM | BODY MASS INDEX: 18.73 KG/M2 | DIASTOLIC BLOOD PRESSURE: 55 MMHG | WEIGHT: 105.69 LBS | OXYGEN SATURATION: 100 % | RESPIRATION RATE: 18 BRPM | HEIGHT: 63 IN

## 2024-04-22 DIAGNOSIS — K52.9 GASTROENTERITIS: Primary | ICD-10-CM

## 2024-04-22 LAB
ALBUMIN SERPL BCP-MCNC: 3.9 G/DL (ref 3.5–5.2)
ALP SERPL-CCNC: 50 U/L (ref 55–135)
ALT SERPL W/O P-5'-P-CCNC: 17 U/L (ref 10–44)
ANION GAP SERPL CALC-SCNC: 12 MMOL/L (ref 8–16)
AST SERPL-CCNC: 24 U/L (ref 10–40)
B-HCG UR QL: NEGATIVE
BASOPHILS # BLD AUTO: 0.01 K/UL (ref 0–0.2)
BASOPHILS NFR BLD: 0.1 % (ref 0–1.9)
BILIRUB SERPL-MCNC: 0.6 MG/DL (ref 0.1–1)
BILIRUB UR QL STRIP: NEGATIVE
BUN SERPL-MCNC: 16 MG/DL (ref 6–20)
CALCIUM SERPL-MCNC: 9.3 MG/DL (ref 8.7–10.5)
CHLORIDE SERPL-SCNC: 106 MMOL/L (ref 95–110)
CLARITY UR: CLEAR
CO2 SERPL-SCNC: 21 MMOL/L (ref 23–29)
COLOR UR: YELLOW
CREAT SERPL-MCNC: 0.8 MG/DL (ref 0.5–1.4)
DIFFERENTIAL METHOD BLD: ABNORMAL
EOSINOPHIL # BLD AUTO: 0 K/UL (ref 0–0.5)
EOSINOPHIL NFR BLD: 0.1 % (ref 0–8)
ERYTHROCYTE [DISTWIDTH] IN BLOOD BY AUTOMATED COUNT: 13.3 % (ref 11.5–14.5)
EST. GFR  (NO RACE VARIABLE): >60 ML/MIN/1.73 M^2
GLUCOSE SERPL-MCNC: 79 MG/DL (ref 70–110)
GLUCOSE UR QL STRIP: NEGATIVE
HCT VFR BLD AUTO: 40.4 % (ref 37–48.5)
HGB BLD-MCNC: 13.6 G/DL (ref 12–16)
HGB UR QL STRIP: ABNORMAL
IMM GRANULOCYTES # BLD AUTO: 0.02 K/UL (ref 0–0.04)
IMM GRANULOCYTES NFR BLD AUTO: 0.3 % (ref 0–0.5)
KETONES UR QL STRIP: ABNORMAL
LEUKOCYTE ESTERASE UR QL STRIP: NEGATIVE
LIPASE SERPL-CCNC: 10 U/L (ref 4–60)
LYMPHOCYTES # BLD AUTO: 0.6 K/UL (ref 1–4.8)
LYMPHOCYTES NFR BLD: 8.4 % (ref 18–48)
MCH RBC QN AUTO: 31.5 PG (ref 27–31)
MCHC RBC AUTO-ENTMCNC: 33.7 G/DL (ref 32–36)
MCV RBC AUTO: 94 FL (ref 82–98)
MONOCYTES # BLD AUTO: 0.5 K/UL (ref 0.3–1)
MONOCYTES NFR BLD: 7.3 % (ref 4–15)
NEUTROPHILS # BLD AUTO: 6.2 K/UL (ref 1.8–7.7)
NEUTROPHILS NFR BLD: 83.8 % (ref 38–73)
NITRITE UR QL STRIP: NEGATIVE
NRBC BLD-RTO: 0 /100 WBC
PH UR STRIP: 6 [PH] (ref 5–8)
PLATELET # BLD AUTO: 239 K/UL (ref 150–450)
PMV BLD AUTO: 11.3 FL (ref 9.2–12.9)
POTASSIUM SERPL-SCNC: 4.2 MMOL/L (ref 3.5–5.1)
PROT SERPL-MCNC: 7.3 G/DL (ref 6–8.4)
PROT UR QL STRIP: ABNORMAL
RBC # BLD AUTO: 4.32 M/UL (ref 4–5.4)
SODIUM SERPL-SCNC: 139 MMOL/L (ref 136–145)
SP GR UR STRIP: 1.02 (ref 1–1.03)
TROPONIN I SERPL DL<=0.01 NG/ML-MCNC: <0.006 NG/ML (ref 0–0.03)
URN SPEC COLLECT METH UR: ABNORMAL
UROBILINOGEN UR STRIP-ACNC: NEGATIVE EU/DL
WBC # BLD AUTO: 7.38 K/UL (ref 3.9–12.7)

## 2024-04-22 PROCEDURE — 96361 HYDRATE IV INFUSION ADD-ON: CPT

## 2024-04-22 PROCEDURE — 81025 URINE PREGNANCY TEST: CPT | Performed by: NURSE PRACTITIONER

## 2024-04-22 PROCEDURE — 84484 ASSAY OF TROPONIN QUANT: CPT | Performed by: NURSE PRACTITIONER

## 2024-04-22 PROCEDURE — 96374 THER/PROPH/DIAG INJ IV PUSH: CPT

## 2024-04-22 PROCEDURE — 99284 EMERGENCY DEPT VISIT MOD MDM: CPT | Mod: 25

## 2024-04-22 PROCEDURE — 81003 URINALYSIS AUTO W/O SCOPE: CPT | Performed by: NURSE PRACTITIONER

## 2024-04-22 PROCEDURE — 85025 COMPLETE CBC W/AUTO DIFF WBC: CPT | Performed by: NURSE PRACTITIONER

## 2024-04-22 PROCEDURE — 25000003 PHARM REV CODE 250: Performed by: NURSE PRACTITIONER

## 2024-04-22 PROCEDURE — 83690 ASSAY OF LIPASE: CPT | Performed by: NURSE PRACTITIONER

## 2024-04-22 PROCEDURE — 80053 COMPREHEN METABOLIC PANEL: CPT | Performed by: NURSE PRACTITIONER

## 2024-04-22 PROCEDURE — 63600175 PHARM REV CODE 636 W HCPCS: Performed by: NURSE PRACTITIONER

## 2024-04-22 RX ORDER — ONDANSETRON HYDROCHLORIDE 2 MG/ML
4 INJECTION, SOLUTION INTRAVENOUS
Status: COMPLETED | OUTPATIENT
Start: 2024-04-22 | End: 2024-04-22

## 2024-04-22 RX ORDER — ONDANSETRON 4 MG/1
4 TABLET, FILM COATED ORAL EVERY 6 HOURS
Qty: 12 TABLET | Refills: 0 | Status: SHIPPED | OUTPATIENT
Start: 2024-04-22

## 2024-04-22 RX ADMIN — SODIUM CHLORIDE 1000 ML: 0.9 SOLUTION INTRAVENOUS at 01:04

## 2024-04-22 RX ADMIN — SODIUM CHLORIDE 1000 ML: 9 INJECTION, SOLUTION INTRAVENOUS at 01:04

## 2024-04-22 RX ADMIN — ONDANSETRON 4 MG: 2 INJECTION INTRAMUSCULAR; INTRAVENOUS at 01:04

## 2024-04-22 NOTE — Clinical Note
"Sharyn Jones" Alexia was seen and treated in our emergency department on 4/22/2024.  She may return to work on 04/24/2024.       If you have any questions or concerns, please don't hesitate to call.      Harleen Pires NP"

## 2024-04-22 NOTE — ED PROVIDER NOTES
Encounter Date: 2024       History     Chief Complaint   Patient presents with    Vomiting    Nausea    Diarrhea     Chief complaint:  vomiting, diarrhea  [age] year female with no significant history presents to be evaluated for vomiting and diarrhea that began 1 day prior.  Patient states she was at a birthday party and 8 different family members urge experiencing similar complaints.  She reports a crampy sensation but no abdominal pain at present.  She reports vomiting and diarrhea.  She reports she attempted to cool down a bottle of water but vomited shortly afterward.  Denies any blood in stool or vomit.  Denies any medications for symptoms.  Denies fever body aches chills      Review of patient's allergies indicates:  No Known Allergies  No past medical history on file.  Past Surgical History:   Procedure Laterality Date     SECTION      ESOPHAGOGASTRODUODENOSCOPY N/A 5/3/2022    Procedure: EGD (ESOPHAGOGASTRODUODENOSCOPY);  Surgeon: Kimberley Donaldson MD;  Location: [facility];  Service: Endoscopy;  Laterality: N/A;     Family History   Problem Relation Name Age of Onset    No Known Problems Mother      No Known Problems Father       Social History     Tobacco Use    Smoking status: Never    Smokeless tobacco: Never   Substance Use Topics    Alcohol use: No    Drug use: No     Review of Systems   Constitutional:  Negative for fever.   Gastrointestinal:  Positive for nausea and vomiting. Negative for abdominal pain.   Neurological:  Positive for weakness.       Physical Exam     Initial Vitals [24 1205]   BP Pulse Resp Temp SpO2   109/75 (!) 139 18 98.6 °F (37 °C) 98 %      MAP       --         Physical Exam    Nursing note and vitals reviewed.  Constitutional: She appears well-developed and well-nourished.   HENT:   Head: Normocephalic and atraumatic.   Cardiovascular:  Normal rate and regular rhythm.           Pulmonary/Chest: Breath sounds normal. She has no wheezes. She has no rhonchi.  She has no rales.   Abdominal: Abdomen is soft. She exhibits no distension. There is no abdominal tenderness. There is no rebound and no guarding.   Musculoskeletal:         General: Normal range of motion.     Neurological: She is alert.   Skin: Skin is warm and dry.   Psychiatric: She has a normal mood and affect. Thought content normal.         ED Course   Procedures  Labs Reviewed   CBC W/ AUTO DIFFERENTIAL - Abnormal; Notable for the following components:       Result Value    MCH 31.5 (*)     Lymph # 0.6 (*)     Gran % 83.8 (*)     Lymph % 8.4 (*)     All other components within normal limits   COMPREHENSIVE METABOLIC PANEL - Abnormal; Notable for the following components:    CO2 21 (*)     Alkaline Phosphatase 50 (*)     All other components within normal limits   URINALYSIS, REFLEX TO URINE CULTURE - Abnormal; Notable for the following components:    Protein, UA Trace (*)     Ketones, UA 2+ (*)     Occult Blood UA Trace (*)     All other components within normal limits    Narrative:     Specimen Source->Urine   TROPONIN I   LIPASE   PREGNANCY TEST, URINE RAPID    Narrative:     Specimen Source->Urine          Imaging Results    None          Medications   sodium chloride 0.9% bolus 1,000 mL 1,000 mL (1,000 mLs Intravenous New Bag 4/22/24 1345)   sodium chloride 0.9% bolus 1,000 mL 1,000 mL (1,000 mLs Intravenous New Bag 4/22/24 1303)   ondansetron injection 4 mg (4 mg Intravenous Given 4/22/24 1306)     Medical Decision Making  [age] year female presents waited for vomiting and diarrhea.  Patient his family members experiencing similar symptoms.  Denies abdominal pain at present   Differential diagnoses include gastroenteritis gastritis, electrolyte abnormality, dehydration, tachycardia    Amount and/or Complexity of Data Reviewed  Labs: ordered.     Details: CBC, CMP, troponin, lipase    Risk  Prescription drug management.  Risk Details: Exam in the ED today   Patient was noted to be tachycardic  however after given 2 L of fluids significantly improved   No leukocytosis or gross electrolyte abnormality   Patient was given fluid replacement antiemetics in the ED she states she is feeling significantly better   P.o. challenged was able to hold down fluids  Stable for DC at this time with follow up with PCP                                      Clinical Impression:  Final diagnoses:  [K52.9] Gastroenteritis (Primary)                 Harleen Pires, NP  04/22/24 2440

## 2025-02-15 ENCOUNTER — TELEPHONE (OUTPATIENT)
Dept: URGENT CARE | Facility: CLINIC | Age: 30
End: 2025-02-15

## 2025-02-15 ENCOUNTER — OFFICE VISIT (OUTPATIENT)
Dept: URGENT CARE | Facility: CLINIC | Age: 30
End: 2025-02-15
Payer: MEDICAID

## 2025-02-15 VITALS
HEIGHT: 63 IN | RESPIRATION RATE: 18 BRPM | WEIGHT: 95.38 LBS | DIASTOLIC BLOOD PRESSURE: 78 MMHG | OXYGEN SATURATION: 99 % | BODY MASS INDEX: 16.9 KG/M2 | TEMPERATURE: 99 F | HEART RATE: 101 BPM | SYSTOLIC BLOOD PRESSURE: 111 MMHG

## 2025-02-15 DIAGNOSIS — J01.40 ACUTE NON-RECURRENT PANSINUSITIS: ICD-10-CM

## 2025-02-15 DIAGNOSIS — J02.9 ACUTE PHARYNGITIS, UNSPECIFIED ETIOLOGY: Primary | ICD-10-CM

## 2025-02-15 RX ORDER — CEFDINIR 300 MG/1
300 CAPSULE ORAL 2 TIMES DAILY
Qty: 20 CAPSULE | Refills: 0 | Status: SHIPPED | OUTPATIENT
Start: 2025-02-15 | End: 2025-02-25

## 2025-02-15 RX ORDER — DEXTROMETHORPHAN HBR, GUAIFENESIN AND PSEUDOEPHEDRINE HCL 60; 380; 20 MG/1; MG/1; MG/1
1 TABLET ORAL EVERY 6 HOURS PRN
Qty: 20 TABLET | Refills: 0 | Status: SHIPPED | OUTPATIENT
Start: 2025-02-15

## 2025-02-15 RX ORDER — NAPROXEN 375 MG/1
375 TABLET ORAL 2 TIMES DAILY
Qty: 20 TABLET | Refills: 0 | Status: SHIPPED | OUTPATIENT
Start: 2025-02-15

## 2025-02-15 NOTE — PATIENT INSTRUCTIONS
Please drink plenty of fluids.  Please get plenty of rest.  Please return here or go to the Emergency Department for any concerns or worsening of condition.  If you were given wait & see antibiotics, please wait 3-5 days before taking them, and only take them if your symptoms have worsened or not improved.  If you do begin taking the antibiotics, please take them to completion.  If you were prescribed antibiotics, please take them to completion.  If you were prescribed a narcotic medication, do not drive or operate heavy equipment or machinery while taking these medications.    You were given a decongestant (RESCON or POLY VENT Dm).  If your insurance does not cover it or you cannot afford it, it is ok to use the over the counter products listed below.  If you do not have Hypertension or any history of palpitations, it is ok to take over the counter Sudafed or Mucinex D or Allegra-D or Claritin-D or Zyrtec-D.  If you do take one of the above, it is ok to combine that with plain over the counter Mucinex or Allegra or Claritin or Zyrtec.  If for example you are taking Zyrtec -D, you can combine that with Mucinex, but not Mucinex-D.  If you are taking Mucinex-D, you can combine that with plain Allegra or Claritin or Zyrtec.   If you do have Hypertension or palpitations, it is safe to take Coricidin HBP for relief of sinus symptoms.    We recommend you take over the counter Flonase (Fluticasone) or another nasally inhaled steroid unless you are already taking one.  Nasal irrigation with a saline spray or Netti Pot like device per their directions is also recommended.  If not allergic, please take over the counter Tylenol (Acetaminophen) and/or Motrin (Ibuprofen) as directed for control of pain and/or fever.    Robitussin DM 2 teas every 4 hours as needed for cough.  If you  smoke, please stop smoking.    Please follow up with your primary care doctor or specialist as needed.  Tory Gallagher MD  [phone]    You  must understand that you have received treatment at an Urgent Care facility only, and that you may be  released before all of your medical problems are known or treated. Urgent Care facilities are not equipped to  handle life threatening emergencies. It is recommended that you seek care at an Emergency Department for  further evaluation of worsening or concerning symptoms, or possibly life threatening conditions as  discussed.    Pharyngitis: Strep (Presumed)    You have pharyngitis (sore throat). The cause is thought to be the streptococcus, or strep, bacterium. Strep throat infection can cause throat pain that is worse when swallowing, aching all over, headache, and fever. The infection may be spread by coughing, kissing, or touching others after touching your mouth or nose. Antibiotic medications are given to treat the infection.  Home care  Rest at home. Drink plenty of fluids to avoid dehydration.  No work or school for the first 2 days of taking the antibiotics. After this time, you will not be contagious. You can then return to work or school if you are feeling better.   The antibiotic medication must be taken for the full 10 days, even if you feel better. This is very important to ensure the infection is treated. It is also important to prevent drug-resistant organisms from developing. If you were given an antibiotic shot, no more antibiotics are needed.  You may use acetaminophen or ibuprofen to control pain or fever, unless another medicine was prescribed for this. If you have chronic liver or kidney disease or ever had a stomach ulcer or GI bleeding, talk with your doctor before using these medicines.  Throat lozenges or a throat-numbing sprays can help reduce throat pain. Gargling with warm salt water can also help. Dissolve 1/2 teaspoon of salt in 1 8 ounce glass of warm water.   Avoid salty or spicy foods, which can irritate the throat.  Follow-up care  Follow up with your healthcare provider or our  staff if you are not improving over the next week.  When to seek medical advice  Call your healthcare provider right away if any of these occur:  Fever as directed by your doctor.   New or worsening ear pain, sinus pain, or headache  Painful lumps in the back of neck  Stiff neck  Lymph nodes are getting larger  Inability to swallow liquids, excessive drooling, or inability to open mouth wide due to throat pain  Signs of dehydration (very dark urine or no urine, sunken eyes, dizziness)  Trouble breathing or noisy breathing  Muffled voice  New rash  Date Last Reviewed: 4/13/2015 © 2000-2016 [employer]. [address] [place] [address]. All rights reserved. This information is not intended as a substitute for professional medical care. Always follow your healthcare professional's instructions.      Acute Bacterial Rhinosinusitis (ABRS)  Acute bacterial rhinosinusitis (ABRS) is an infection of your nasal cavity and sinuses. Its caused by bacteria. Acute means that youve had symptoms for less than 12 weeks.  Understanding your sinuses  The nasal cavity is the large air-filled space behind your nose. The sinuses are a group of spaces formed by the bones of your face. They connect with your nasal cavity. ABRS causes the tissue lining these spaces to become inflamed. Mucus may not drain normally. This leads to facial pain and other symptoms.  What causes ABRS?  ABRS most often follows an upper respiratory infection caused by a virus. Bacteria then infect the lining of your nasal cavity and sinuses. But you can also get ABRS if you have:  Nasal allergies  Long-term nasal swelling and congestion not caused by allergies  Blockage in the nose  Symptoms of ABRS  The symptoms of ABRS may be different for each person, and can include:  Nasal congestion  Runny nose  Fluid draining from the nose down the throat (postnasal drip)  Headache  Cough  Pain in the sinuses  Thick, colored fluid from the nose  (mucus)  Fever  Diagnosing ABRS  ABRS may be diagnosed if youve had an upper respiratory infection like a cold and cough for longer than 10 to 14 days. Your health care provider will ask about your symptoms and your medical history. The provider will check your vital signs, including your temperature. Youll have a physical exam. The health care provider will check your ears, nose, and throat. You likely wont need any tests. If ABRS comes back, you may have a culture or other tests.  Treatment for ABRS  Treatment may include:  Antibiotic medicine. This is for symptoms that last for at least 10 to 14 days.  Nasal corticosteroid medicine. Drops or spray used in the nose can lessen swelling and congestion.  Over-the-counter pain medicine. This is to lessen sinus pain and pressure.  Nasal decongestant medicine. Spray or drops may help to lessen congestion. Do not use them for more than a few days.  Salt wash (saline irrigation). This can help to loosen mucus.  Possible complications of ABRS  ABRS may come back or become long-term (chronic).  In rare cases, ABRS may cause complications such as:   Inflamed tissue around the brain and spinal cord (meningitis)  Inflamed tissue around the eyes (orbital cellulitis)  Inflamed bones around the sinuses (osteitis)  These problems may need to be treated in a hospital with intravenous (IV) antibiotic medicine or surgery.  When to call the health care provider  Call your health care provider if you have any of the following:  Symptoms that dont get better, or get worse  Symptoms that dont get better after 3 to 5 days on antibiotics  Trouble seeing  Swelling around your eyes  Confusion or trouble staying awake   Date Last Reviewed: 3/3/2015  © 8093-2890 [employer]. [address] [place] [address], [place] [address]. All rights reserved. This information is not intended as a substitute for professional medical care. Always follow your healthcare professional's  instructions.

## 2025-02-15 NOTE — LETTER
February 15, 2025  Sharyn Hale  [address]  [place] [address]                [facility] [place] [facility]  [address], [address]  [facility] [address]  Phone: [phone]  Fax: [phone] Sharyn Hale was seen and treated in our Urgent Care department on 2/15/2025. She may return to work in 2 - 3 days.      If you have any questions or concerns, please don't hesitate to call.        Sincerely,        Barber Carter MD

## 2025-02-15 NOTE — TELEPHONE ENCOUNTER
Pt called stating that none of the pharmacies have the poly vent d that she was prescribed. Per dr. Carter she can take sudafed, mucinex d, or clovis seltzer plus or she can give her pharmacy the prescription and have them call us for a substitution of whatever is covered by her insurance.

## 2025-02-15 NOTE — PROGRESS NOTES
"Subjective:      Patient ID: Sharyn Hale is a 29 y.o. female.    Vitals:  height is 5' 3" (1.6 m) and weight is 43.2 kg (95 lb 5.6 oz). Her oral temperature is 99 °F (37.2 °C). Her blood pressure is 111/78 and her pulse is 101. Her respiration is 18 and oxygen saturation is 99%.     Chief Complaint: Sore Throat    Patient is coming in for left side ear pain and sore throat. She states symptoms been going on since Thursday afternoon.    Sore Throat   This is a new problem. Episode onset: 3 days. The problem has been gradually worsening. The pain is worse on the left side. There has been no fever. The pain is at a severity of 7/10. The pain is moderate. Associated symptoms include ear pain and swollen glands. She has tried nothing for the symptoms. The treatment provided no relief.       Constitution: Negative.   HENT:  Positive for ear pain and sore throat.    Cardiovascular: Negative.    Eyes: Negative.    Respiratory: Negative.     Gastrointestinal: Negative.    Endocrine: negative.   Genitourinary: Negative.    Musculoskeletal: Negative.    Skin: Negative.    Allergic/Immunologic: Negative.    Neurological: Negative.    Hematologic/Lymphatic: Negative.    Psychiatric/Behavioral: Negative.        Objective:     Physical Exam   Constitutional: She is oriented to person, place, and time. She appears well-developed. She is cooperative.  Non-toxic appearance. She does not appear ill. No distress.   HENT:   Head: Normocephalic and atraumatic.   Ears:   Right Ear: Hearing, tympanic membrane, external ear and ear canal normal.   Left Ear: Hearing, tympanic membrane, external ear and ear canal normal.   Nose: No mucosal edema, rhinorrhea or nasal deformity. No epistaxis. Right sinus exhibits maxillary sinus tenderness and frontal sinus tenderness. Left sinus exhibits maxillary sinus tenderness and frontal sinus tenderness.   Mouth/Throat: Uvula is midline and mucous membranes are normal. No trismus in the jaw. Normal " dentition. No uvula swelling. Posterior oropharyngeal edema and posterior oropharyngeal erythema present. No oropharyngeal exudate.   Eyes: Conjunctivae and lids are normal. No scleral icterus.   Neck: Trachea normal and phonation normal. Neck supple. No edema present. No erythema present. No neck rigidity present.   Cardiovascular: Normal rate, regular rhythm, normal heart sounds and normal pulses.   Pulmonary/Chest: Effort normal and breath sounds normal. No respiratory distress. She has no decreased breath sounds. She has no rhonchi.   Abdominal: Normal appearance.   Musculoskeletal: Normal range of motion.         General: No deformity or edema. Normal range of motion.   Neurological: She is alert and oriented to person, place, and time. She exhibits normal muscle tone. Coordination normal.   Skin: Skin is warm, dry, intact, not diaphoretic and not pale.   Psychiatric: Her speech is normal and behavior is normal. Judgment and thought content normal.   Nursing note and vitals reviewed.      Assessment:     1. Acute pharyngitis, unspecified etiology    2. Acute non-recurrent pansinusitis        Plan:       Acute pharyngitis, unspecified etiology    Acute non-recurrent pansinusitis  -     cefdinir (OMNICEF) 300 MG capsule; Take 1 capsule (300 mg total) by mouth 2 (two) times daily. for 10 days  Dispense: 20 capsule; Refill: 0  -     pseudoephedrine-DM-guaiFENesin (POLY-VENT DM) 60- mg Tab; Take 1 tablet by mouth every 6 (six) hours as needed.  Dispense: 20 tablet; Refill: 0  -     naproxen (NAPROSYN) 375 MG tablet; Take 1 tablet (375 mg total) by mouth 2 (two) times daily.  Dispense: 20 tablet; Refill: 0      Please drink plenty of fluids.  Please get plenty of rest.  Please return here or go to the Emergency Department for any concerns or worsening of condition.  If you were given wait & see antibiotics, please wait 3-5 days before taking them, and only take them if your symptoms have worsened or not  improved.  If you do begin taking the antibiotics, please take them to completion.  If you were prescribed antibiotics, please take them to completion.  If you were prescribed a narcotic medication, do not drive or operate heavy equipment or machinery while taking these medications.    You were given a decongestant (RESCON or POLY VENT Dm).  If your insurance does not cover it or you cannot afford it, it is ok to use the over the counter products listed below.  If you do not have Hypertension or any history of palpitations, it is ok to take over the counter Sudafed or Mucinex D or Allegra-D or Claritin-D or Zyrtec-D.  If you do take one of the above, it is ok to combine that with plain over the counter Mucinex or Allegra or Claritin or Zyrtec.  If for example you are taking Zyrtec -D, you can combine that with Mucinex, but not Mucinex-D.  If you are taking Mucinex-D, you can combine that with plain Allegra or Claritin or Zyrtec.   If you do have Hypertension or palpitations, it is safe to take Coricidin HBP for relief of sinus symptoms.    We recommend you take over the counter Flonase (Fluticasone) or another nasally inhaled steroid unless you are already taking one.  Nasal irrigation with a saline spray or Netti Pot like device per their directions is also recommended.  If not allergic, please take over the counter Tylenol (Acetaminophen) and/or Motrin (Ibuprofen) as directed for control of pain and/or fever.    Robitussin DM 2 teas every 4 hours as needed for cough.  If you  smoke, please stop smoking.    Please follow up with your primary care doctor or specialist as needed.  Tory Gallagher MD  [phone]    You must understand that you have received treatment at an Urgent Care facility only, and that you may be  released before all of your medical problems are known or treated. Urgent Care facilities are not equipped to  handle life threatening emergencies. It is recommended that you seek care at an  Emergency Department for  further evaluation of worsening or concerning symptoms, or possibly life threatening conditions as  discussed.